# Patient Record
Sex: FEMALE | Race: WHITE | NOT HISPANIC OR LATINO | URBAN - METROPOLITAN AREA
[De-identification: names, ages, dates, MRNs, and addresses within clinical notes are randomized per-mention and may not be internally consistent; named-entity substitution may affect disease eponyms.]

---

## 2023-03-28 LAB
ALANINE AMINOTRANSFERASE (SGPT) (U/L) IN SER/PLAS: 27 U/L (ref 7–45)
ALBUMIN (G/DL) IN SER/PLAS: 4.2 G/DL (ref 3.4–5)
ALKALINE PHOSPHATASE (U/L) IN SER/PLAS: 57 U/L (ref 33–136)
ANION GAP IN SER/PLAS: 15 MMOL/L (ref 10–20)
APPEARANCE, URINE: ABNORMAL
ASPARTATE AMINOTRANSFERASE (SGOT) (U/L) IN SER/PLAS: 16 U/L (ref 9–39)
BASOPHILS (10*3/UL) IN BLOOD BY AUTOMATED COUNT: 0.03 X10E9/L (ref 0–0.1)
BASOPHILS/100 LEUKOCYTES IN BLOOD BY AUTOMATED COUNT: 0.4 % (ref 0–2)
BILIRUBIN TOTAL (MG/DL) IN SER/PLAS: 0.6 MG/DL (ref 0–1.2)
BILIRUBIN, URINE: NEGATIVE
BLOOD, URINE: ABNORMAL
CALCIDIOL (25 OH VITAMIN D3) (NG/ML) IN SER/PLAS: 27 NG/ML
CALCIUM (MG/DL) IN SER/PLAS: 9.6 MG/DL (ref 8.6–10.6)
CARBON DIOXIDE, TOTAL (MMOL/L) IN SER/PLAS: 26 MMOL/L (ref 21–32)
CHLORIDE (MMOL/L) IN SER/PLAS: 100 MMOL/L (ref 98–107)
CHOLESTEROL (MG/DL) IN SER/PLAS: 278 MG/DL (ref 0–199)
CHOLESTEROL IN HDL (MG/DL) IN SER/PLAS: 55.1 MG/DL
CHOLESTEROL/HDL RATIO: 5
COLOR, URINE: YELLOW
CREATININE (MG/DL) IN SER/PLAS: 0.91 MG/DL (ref 0.5–1.05)
EOSINOPHILS (10*3/UL) IN BLOOD BY AUTOMATED COUNT: 0.16 X10E9/L (ref 0–0.4)
EOSINOPHILS/100 LEUKOCYTES IN BLOOD BY AUTOMATED COUNT: 2.2 % (ref 0–6)
ERYTHROCYTE DISTRIBUTION WIDTH (RATIO) BY AUTOMATED COUNT: 12.8 % (ref 11.5–14.5)
ERYTHROCYTE MEAN CORPUSCULAR HEMOGLOBIN CONCENTRATION (G/DL) BY AUTOMATED: 31.7 G/DL (ref 32–36)
ERYTHROCYTE MEAN CORPUSCULAR VOLUME (FL) BY AUTOMATED COUNT: 92 FL (ref 80–100)
ERYTHROCYTES (10*6/UL) IN BLOOD BY AUTOMATED COUNT: 4.69 X10E12/L (ref 4–5.2)
GFR FEMALE: 64 ML/MIN/1.73M2
GLUCOSE (MG/DL) IN SER/PLAS: 185 MG/DL (ref 74–99)
GLUCOSE, URINE: NEGATIVE MG/DL
HEMATOCRIT (%) IN BLOOD BY AUTOMATED COUNT: 43.2 % (ref 36–46)
HEMOGLOBIN (G/DL) IN BLOOD: 13.7 G/DL (ref 12–16)
IMMATURE GRANULOCYTES/100 LEUKOCYTES IN BLOOD BY AUTOMATED COUNT: 0.5 % (ref 0–0.9)
KETONES, URINE: NEGATIVE MG/DL
LDL: 151 MG/DL (ref 0–99)
LEUKOCYTE ESTERASE, URINE: ABNORMAL
LEUKOCYTES (10*3/UL) IN BLOOD BY AUTOMATED COUNT: 7.4 X10E9/L (ref 4.4–11.3)
LYMPHOCYTES (10*3/UL) IN BLOOD BY AUTOMATED COUNT: 1.52 X10E9/L (ref 0.8–3)
LYMPHOCYTES/100 LEUKOCYTES IN BLOOD BY AUTOMATED COUNT: 20.5 % (ref 13–44)
MONOCYTES (10*3/UL) IN BLOOD BY AUTOMATED COUNT: 0.67 X10E9/L (ref 0.05–0.8)
MONOCYTES/100 LEUKOCYTES IN BLOOD BY AUTOMATED COUNT: 9 % (ref 2–10)
MUCUS, URINE: NORMAL /LPF
NEUTROPHILS (10*3/UL) IN BLOOD BY AUTOMATED COUNT: 5.01 X10E9/L (ref 1.6–5.5)
NEUTROPHILS/100 LEUKOCYTES IN BLOOD BY AUTOMATED COUNT: 67.4 % (ref 40–80)
NITRITE, URINE: NEGATIVE
NON HDL CHOLESTEROL: 223 MG/DL
NRBC (PER 100 WBCS) BY AUTOMATED COUNT: 0 /100 WBC (ref 0–0)
PH, URINE: 5 (ref 5–8)
PLATELETS (10*3/UL) IN BLOOD AUTOMATED COUNT: 195 X10E9/L (ref 150–450)
POTASSIUM (MMOL/L) IN SER/PLAS: 4.9 MMOL/L (ref 3.5–5.3)
PROTEIN TOTAL: 6.8 G/DL (ref 6.4–8.2)
PROTEIN, URINE: NEGATIVE MG/DL
RBC, URINE: 1 /HPF (ref 0–5)
SODIUM (MMOL/L) IN SER/PLAS: 136 MMOL/L (ref 136–145)
SPECIFIC GRAVITY, URINE: 1.01 (ref 1–1.03)
SQUAMOUS EPITHELIAL CELLS, URINE: 4 /HPF
THYROTROPIN (MIU/L) IN SER/PLAS BY DETECTION LIMIT <= 0.05 MIU/L: 2.63 MIU/L (ref 0.44–3.98)
THYROXINE (T4) FREE (NG/DL) IN SER/PLAS: 1.18 NG/DL (ref 0.78–1.48)
TRIGLYCERIDE (MG/DL) IN SER/PLAS: 359 MG/DL (ref 0–149)
UREA NITROGEN (MG/DL) IN SER/PLAS: 21 MG/DL (ref 6–23)
UROBILINOGEN, URINE: <2 MG/DL (ref 0–1.9)
VLDL: 72 MG/DL (ref 0–40)
WBC, URINE: 3 /HPF (ref 0–5)

## 2023-04-20 LAB — URINE CULTURE: NORMAL

## 2023-06-27 LAB
ALBUMIN (MG/L) IN URINE: 13.8 MG/L
ALBUMIN/CREATININE (UG/MG) IN URINE: 30.1 UG/MG CRT (ref 0–30)
CREATININE (MG/DL) IN URINE: 45.9 MG/DL (ref 20–320)

## 2023-08-15 LAB
CHOLESTEROL (MG/DL) IN SER/PLAS: 224 MG/DL (ref 0–199)
CHOLESTEROL IN HDL (MG/DL) IN SER/PLAS: 57.7 MG/DL
CHOLESTEROL/HDL RATIO: 3.9
LDL: 122 MG/DL (ref 0–99)
NON HDL CHOLESTEROL: 166 MG/DL
TRIGLYCERIDE (MG/DL) IN SER/PLAS: 221 MG/DL (ref 0–149)
VLDL: 44 MG/DL (ref 0–40)

## 2023-08-16 LAB
ESTIMATED AVERAGE GLUCOSE FOR HBA1C: 148 MG/DL
HEMOGLOBIN A1C/HEMOGLOBIN TOTAL IN BLOOD: 6.8 %

## 2023-11-03 DIAGNOSIS — E78.2 MIXED HYPERLIPIDEMIA: Primary | ICD-10-CM

## 2023-11-03 RX ORDER — PRAVASTATIN SODIUM 10 MG/1
10 TABLET ORAL DAILY
Qty: 90 TABLET | Refills: 1 | Status: SHIPPED
Start: 2023-11-03 | End: 2023-11-14 | Stop reason: SDUPTHER

## 2023-11-03 RX ORDER — PRAVASTATIN SODIUM 10 MG/1
10 TABLET ORAL DAILY
COMMUNITY
End: 2023-11-03 | Stop reason: SDUPTHER

## 2023-11-12 RX ORDER — PREDNISONE 20 MG/1
TABLET ORAL
COMMUNITY
Start: 2022-09-06 | End: 2023-11-14 | Stop reason: WASHOUT

## 2023-11-12 RX ORDER — TRIAMCINOLONE ACETONIDE 1 MG/G
CREAM TOPICAL 2 TIMES DAILY
COMMUNITY
End: 2023-11-14 | Stop reason: ALTCHOICE

## 2023-11-12 RX ORDER — VARENICLINE TARTRATE 0.5 (11)-1
KIT ORAL
COMMUNITY
Start: 2019-07-25 | End: 2023-11-14 | Stop reason: ALTCHOICE

## 2023-11-12 RX ORDER — CHOLECALCIFEROL (VITAMIN D3) 50 MCG
TABLET ORAL DAILY
COMMUNITY

## 2023-11-12 RX ORDER — VALACYCLOVIR HYDROCHLORIDE 1 G/1
1000 TABLET, FILM COATED ORAL 3 TIMES DAILY
COMMUNITY
Start: 2019-10-01 | End: 2023-11-14 | Stop reason: WASHOUT

## 2023-11-12 RX ORDER — GABAPENTIN 300 MG/1
300 CAPSULE ORAL 2 TIMES DAILY
COMMUNITY
Start: 2019-08-13 | End: 2023-11-14 | Stop reason: WASHOUT

## 2023-11-12 RX ORDER — LEVOTHYROXINE SODIUM 75 UG/1
75 TABLET ORAL DAILY
COMMUNITY
End: 2023-11-20 | Stop reason: SDUPTHER

## 2023-11-12 RX ORDER — FERROUS SULFATE 325(65) MG
325 TABLET ORAL 2 TIMES DAILY
COMMUNITY
End: 2024-03-22 | Stop reason: SDUPTHER

## 2023-11-12 RX ORDER — DIPHENHYDRAMINE HCL 25 MG
25 TABLET ORAL NIGHTLY PRN
COMMUNITY

## 2023-11-14 ENCOUNTER — OFFICE VISIT (OUTPATIENT)
Dept: PRIMARY CARE | Facility: CLINIC | Age: 78
End: 2023-11-14
Payer: MEDICARE

## 2023-11-14 VITALS
HEART RATE: 64 BPM | WEIGHT: 139 LBS | TEMPERATURE: 97.2 F | SYSTOLIC BLOOD PRESSURE: 130 MMHG | DIASTOLIC BLOOD PRESSURE: 80 MMHG | RESPIRATION RATE: 16 BRPM

## 2023-11-14 DIAGNOSIS — E10.9 TYPE 1 DIABETES MELLITUS WITHOUT COMPLICATION (MULTI): ICD-10-CM

## 2023-11-14 DIAGNOSIS — E78.2 MIXED HYPERLIPIDEMIA: ICD-10-CM

## 2023-11-14 LAB
POC FINGERSTICK BLOOD GLUCOSE: 112 MG/DL (ref 70–100)
POC HEMOGLOBIN A1C: 6.2 % (ref 4.2–6.5)

## 2023-11-14 PROCEDURE — 1159F MED LIST DOCD IN RCRD: CPT | Performed by: NURSE PRACTITIONER

## 2023-11-14 PROCEDURE — 83036 HEMOGLOBIN GLYCOSYLATED A1C: CPT | Performed by: NURSE PRACTITIONER

## 2023-11-14 PROCEDURE — 82962 GLUCOSE BLOOD TEST: CPT | Performed by: NURSE PRACTITIONER

## 2023-11-14 PROCEDURE — 99213 OFFICE O/P EST LOW 20 MIN: CPT | Performed by: NURSE PRACTITIONER

## 2023-11-14 PROCEDURE — 1160F RVW MEDS BY RX/DR IN RCRD: CPT | Performed by: NURSE PRACTITIONER

## 2023-11-14 RX ORDER — PRAVASTATIN SODIUM 10 MG/1
10 TABLET ORAL 2 TIMES WEEKLY
Qty: 90 TABLET | Refills: 1 | Status: SHIPPED | OUTPATIENT
Start: 2023-11-16 | End: 2024-03-07 | Stop reason: SDUPTHER

## 2023-11-14 ASSESSMENT — ENCOUNTER SYMPTOMS
ENDOCRINE NEGATIVE: 1
CARDIOVASCULAR NEGATIVE: 1
RESPIRATORY NEGATIVE: 1
NEUROLOGICAL NEGATIVE: 1
CONSTITUTIONAL NEGATIVE: 1

## 2023-11-14 NOTE — PROGRESS NOTES
Gianna Valenzuela is a 78 y.o. here for a diabetic follow up exam.     Pt states they are doing well. Following a low carbohydrate diet and is active.     Up to date with eye and foot exams, pcp visits.     Urine micro 6/23 normal  Last weight on chart: 143  Last aic on chart 8/23 was 6.8 - we started at 7.8 - TODAYS AIC IS 6.2%   And midday bs is 112   She is all low cho and activity no meds    Eye care - dr [facility]  [facility]  Follow up 3months    Vitals:    11/14/23 1256   Weight: 63 kg (139 lb)      Lab Results   Component Value Date    POCGLU 112 (A) 11/14/2023    HGBA1C 6.2 11/14/2023    HGBA1C 6.8 (A) 08/15/2023        Lab Results   Component Value Date    HGBA1C 6.8 (A) 08/15/2023   (  There were no vitals filed for this visit.   There were no vitals taken for this visit.     Current Outpatient Medications on File Prior to Visit   Medication Sig Dispense Refill    cholecalciferol (Vitamin D-3) 50 MCG (2000 UT) tablet Take by mouth once daily.      ferrous sulfate, 325 mg ferrous sulfate, tablet Take 1 tablet (325 mg) by mouth 2 times a day.      levothyroxine (Synthroid, Levoxyl) 75 mcg tablet Take 1 tablet (75 mcg) by mouth once daily.      MAGNESIUM ORAL Take 1 tablet by mouth once daily.      pravastatin (Pravachol) 10 mg tablet Take 1 tablet (10 mg) by mouth once daily. 90 tablet 1    triamcinolone (Kenalog) 0.1 % cream Apply topically 2 times a day.      diphenhydrAMINE (Benadryl Allergy) 25 mg tablet Take 1 tablet (25 mg) by mouth as needed at bedtime for itching or sleep.      varenicline (Chantix Starting Month Box) 0.5 mg (11)- 1 mg (42) tablet Take by mouth.      [DISCONTINUED] gabapentin (Neurontin) 300 mg capsule Take 1 capsule (300 mg) by mouth 2 times a day.      [DISCONTINUED] predniSONE (Deltasone) 20 mg tablet Take by mouth.  3 po qd w food x 3 days then 2 po qd w food x 3 days then 1 po qd x 3 days then stop      [DISCONTINUED] valACYclovir (Valtrex) 1 gram tablet Take 1  tablet (1,000 mg) by mouth 3 times a day.       No current facility-administered medications on file prior to visit.      Review of Systems   Constitutional: Negative.    Respiratory: Negative.     Cardiovascular: Negative.    Endocrine: Negative.    Neurological: Negative.    All other systems reviewed and are negative.       Physical Exam  Vitals and nursing note reviewed.   Constitutional:       Appearance: Normal appearance. She is normal weight.   HENT:      Head: Normocephalic.   Eyes:      Pupils: Pupils are equal, round, and reactive to light.   Cardiovascular:      Rate and Rhythm: Normal rate and regular rhythm.      Heart sounds: Normal heart sounds.   Pulmonary:      Effort: Pulmonary effort is normal.      Breath sounds: Normal breath sounds.   Skin:     General: Skin is warm and dry.   Neurological:      General: No focal deficit present.      Mental Status: She is alert and oriented to person, place, and time. Mental status is at baseline.   Psychiatric:         Mood and Affect: Mood normal.         Behavior: Behavior normal.         Thought Content: Thought content normal.         Judgment: Judgment normal.        Problem List Items Addressed This Visit    None  Visit Diagnoses         Codes    Type 1 diabetes mellitus without complication (CMS/[facility])     E10.9    Relevant Orders    POCT Fingerstick Glucose manually resulted (Completed)    POCT glycosylated hemoglobin (Hb A1C) manually resulted (Completed)

## 2023-11-20 DIAGNOSIS — E03.9 HYPOTHYROIDISM, UNSPECIFIED TYPE: Primary | ICD-10-CM

## 2023-11-20 RX ORDER — LEVOTHYROXINE SODIUM 75 UG/1
75 TABLET ORAL DAILY
Qty: 90 TABLET | Refills: 1 | Status: SHIPPED | OUTPATIENT
Start: 2023-11-20 | End: 2024-03-22 | Stop reason: SDUPTHER

## 2024-02-13 ASSESSMENT — ENCOUNTER SYMPTOMS
WEAKNESS: 0
WEIGHT LOSS: 0
DIZZINESS: 0
POLYPHAGIA: 1
BLURRED VISION: 0
SEIZURES: 0
CONFUSION: 0
POLYDIPSIA: 0
HUNGER: 1
TREMORS: 0
SWEATS: 0
HEADACHES: 0
VISUAL CHANGE: 0
SPEECH DIFFICULTY: 0
FATIGUE: 0
BLACKOUTS: 0
NERVOUS/ANXIOUS: 0

## 2024-02-14 ENCOUNTER — OFFICE VISIT (OUTPATIENT)
Dept: PRIMARY CARE | Facility: CLINIC | Age: 79
End: 2024-02-14
Payer: MEDICARE

## 2024-02-14 VITALS
TEMPERATURE: 98.3 F | HEART RATE: 67 BPM | DIASTOLIC BLOOD PRESSURE: 70 MMHG | WEIGHT: 143 LBS | SYSTOLIC BLOOD PRESSURE: 132 MMHG | OXYGEN SATURATION: 97 %

## 2024-02-14 DIAGNOSIS — Z85.828 HISTORY OF SKIN CANCER: ICD-10-CM

## 2024-02-14 DIAGNOSIS — E04.2 MULTIPLE THYROID NODULES: ICD-10-CM

## 2024-02-14 DIAGNOSIS — E11.65 TYPE 2 DIABETES MELLITUS WITH HYPERGLYCEMIA, WITHOUT LONG-TERM CURRENT USE OF INSULIN (MULTI): Primary | ICD-10-CM

## 2024-02-14 DIAGNOSIS — E66.3 OVERWEIGHT (BMI 25.0-29.9): ICD-10-CM

## 2024-02-14 LAB
POC FINGERSTICK BLOOD GLUCOSE: 115 MG/DL (ref 70–100)
POC HEMOGLOBIN A1C: 6.5 % (ref 4.2–6.5)

## 2024-02-14 PROCEDURE — 82962 GLUCOSE BLOOD TEST: CPT | Performed by: NURSE PRACTITIONER

## 2024-02-14 PROCEDURE — 83036 HEMOGLOBIN GLYCOSYLATED A1C: CPT | Performed by: NURSE PRACTITIONER

## 2024-02-14 PROCEDURE — 99213 OFFICE O/P EST LOW 20 MIN: CPT | Performed by: NURSE PRACTITIONER

## 2024-02-14 PROCEDURE — 1159F MED LIST DOCD IN RCRD: CPT | Performed by: NURSE PRACTITIONER

## 2024-02-14 ASSESSMENT — ENCOUNTER SYMPTOMS
DIZZINESS: 0
HEADACHES: 0
POLYPHAGIA: 1
TREMORS: 0
RESPIRATORY NEGATIVE: 1
CONSTITUTIONAL NEGATIVE: 1
NERVOUS/ANXIOUS: 0
CARDIOVASCULAR NEGATIVE: 1
SEIZURES: 0
CONFUSION: 0
FATIGUE: 0
SPEECH DIFFICULTY: 0
WEAKNESS: 0
POLYDIPSIA: 0
NEUROLOGICAL NEGATIVE: 1

## 2024-02-14 NOTE — PROGRESS NOTES
Gianna Valenzuela is a 78 y.o. here for a diabetic follow up exam.     Pt states they are doing well. Following a low carbohydrate diet and is active.     Up to date with eye and foot exams, pcp visits.     Urine micro 6/23 neg   Last weight 139 - last 143   Last aic 6.2 (6.8) - today 6.5    Wants a new derm - dr ellis referral - skin squamous - needs a yearly mole mapping     Will start b12 for some neuropahty sensations both feet and will follow up with 'dr hassan in 2-3 weeks     Meds: none     Thyroid nodules in past will check ultrasound     Visit Vitals  /70   Pulse 67   Temp 36.8 °C (98.3 °F)      Vitals:    02/14/24 1303   Weight: 64.9 kg (143 lb)        Lab Results   Component Value Date    POCGLU 112 (A) 11/14/2023    HGBA1C 6.2 11/14/2023    HGBA1C 6.8 (A) 08/15/2023     There were no vitals taken for this visit.   Wt Readings from Last 5 Encounters:   11/14/23 63 kg (139 lb)          Review of Systems   Constitutional: Negative.  Negative for fatigue.   Respiratory: Negative.     Cardiovascular: Negative.  Negative for chest pain.   Endocrine: Positive for polyphagia. Negative for polydipsia and polyuria.   Skin:  Negative for pallor.   Neurological: Negative.  Negative for dizziness, tremors, seizures, speech difficulty, weakness and headaches.   Psychiatric/Behavioral:  Negative for confusion. The patient is not nervous/anxious.    All other systems reviewed and are negative.       Physical Exam  Vitals and nursing note reviewed.   Constitutional:       Appearance: Normal appearance.   HENT:      Head: Normocephalic.   Eyes:      Pupils: Pupils are equal, round, and reactive to light.   Cardiovascular:      Rate and Rhythm: Normal rate and regular rhythm.      Heart sounds: Normal heart sounds.   Pulmonary:      Effort: Pulmonary effort is normal.      Breath sounds: Normal breath sounds.   Skin:     General: Skin is warm and dry.   Neurological:      General: No focal deficit present.       Mental Status: She is alert and oriented to person, place, and time. Mental status is at baseline.   Psychiatric:         Mood and Affect: Mood normal.         Behavior: Behavior normal.         Thought Content: Thought content normal.         Judgment: Judgment normal.             Problem List Items Addressed This Visit    None  Visit Diagnoses         Codes    Type 2 diabetes mellitus with hyperglycemia, without long-term current use of insulin (CMS/[facility])    -  Primary E11.65    Relevant Orders    POCT glycosylated hemoglobin (Hb A1C) manually resulted    POCT Fingerstick Glucose manually resulted (Completed)    Overweight (BMI 25.0-29.9)     E66.3             Laura L Seaver, APRN-CNP   Answers submitted by the patient for this visit:  Diabetes Questionnaire (Submitted on 2/13/2024)  Chief Complaint: Diabetes problem  Diabetes type: type 1  MedicAlert ID: No  Disease duration: 9 Months  blurred vision: No  foot paresthesias: Yes  foot ulcerations: No  visual change: No  weight loss: No  Symptom course: stable  hunger: Yes  mood changes: No  sleepiness: Yes  sweats: No  blackouts: No  hospitalization: No  nocturnal hypoglycemia: No  required assistance: No  required glucagon: No  CVA: No  heart disease: No  impotence: No  nephropathy: No  peripheral neuropathy: No  PVD: No  retinopathy: No  CAD risks: dyslipidemia, obesity, tobacco exposure  Current treatments: oral agent (monotherapy)  Treatment compliance: most of the time  Home urines: 1-2 x per month  Monitoring compliance: no compliance  Current diet: generally healthy  Meal planning: carbohydrate counting  Exercise: daily  Dietitian visit: No  Eye exam current: No  Sees podiatrist: No

## 2024-02-14 NOTE — PROGRESS NOTES
Answers submitted by the patient for this visit:  Diabetes Questionnaire (Submitted on 2/13/2024)  Chief Complaint: Diabetes problem  Diabetes type: type 1  MedicAlert ID: No  Disease duration: 9 Months  blurred vision: No  chest pain: No  fatigue: No  foot paresthesias: Yes  foot ulcerations: No  polydipsia: No  polyphagia: Yes  polyuria: No  visual change: No  weakness: No  weight loss: No  Symptom course: stable  confusion: No  dizziness: No  headaches: No  hunger: Yes  mood changes: No  nervous/anxious: No  pallor: No  seizures: No  sleepiness: Yes  speech difficulty: No  sweats: No  tremors: No  blackouts: No  hospitalization: No  nocturnal hypoglycemia: No  required assistance: No  required glucagon: No  CVA: No  heart disease: No  impotence: No  nephropathy: No  peripheral neuropathy: No  PVD: No  retinopathy: No  CAD risks: dyslipidemia, obesity, tobacco exposure  Current treatments: oral agent (monotherapy)  Treatment compliance: most of the time  Home urines: 1-2 x per month  Monitoring compliance: no compliance  Current diet: generally healthy  Meal planning: carbohydrate counting  Exercise: daily  Dietitian visit: No  Eye exam current: No  Sees podiatrist: No

## 2024-02-28 ENCOUNTER — HOSPITAL ENCOUNTER (OUTPATIENT)
Dept: RADIOLOGY | Facility: CLINIC | Age: 79
Discharge: HOME | End: 2024-02-28
Payer: MEDICARE

## 2024-02-28 DIAGNOSIS — E04.2 MULTIPLE THYROID NODULES: ICD-10-CM

## 2024-02-28 PROCEDURE — 76536 US EXAM OF HEAD AND NECK: CPT | Performed by: RADIOLOGY

## 2024-02-28 PROCEDURE — 76536 US EXAM OF HEAD AND NECK: CPT

## 2024-03-01 DIAGNOSIS — E04.2 MULTIPLE THYROID NODULES: Primary | ICD-10-CM

## 2024-03-07 ENCOUNTER — OFFICE VISIT (OUTPATIENT)
Dept: PRIMARY CARE | Facility: CLINIC | Age: 79
End: 2024-03-07
Payer: MEDICARE

## 2024-03-07 VITALS
DIASTOLIC BLOOD PRESSURE: 74 MMHG | SYSTOLIC BLOOD PRESSURE: 116 MMHG | WEIGHT: 141 LBS | TEMPERATURE: 98.1 F | HEIGHT: 61 IN | BODY MASS INDEX: 26.62 KG/M2 | HEART RATE: 70 BPM

## 2024-03-07 DIAGNOSIS — F17.200 SMOKER: ICD-10-CM

## 2024-03-07 DIAGNOSIS — E03.9 HYPOTHYROIDISM, UNSPECIFIED TYPE: ICD-10-CM

## 2024-03-07 DIAGNOSIS — E11.65 TYPE 2 DIABETES MELLITUS WITH HYPERGLYCEMIA, WITHOUT LONG-TERM CURRENT USE OF INSULIN (MULTI): ICD-10-CM

## 2024-03-07 DIAGNOSIS — E78.2 MIXED HYPERLIPIDEMIA: ICD-10-CM

## 2024-03-07 DIAGNOSIS — Z00.00 MEDICARE ANNUAL WELLNESS VISIT, SUBSEQUENT: Primary | ICD-10-CM

## 2024-03-07 DIAGNOSIS — M15.9 GENERALIZED OSTEOARTHRITIS: ICD-10-CM

## 2024-03-07 DIAGNOSIS — R91.1 LUNG NODULE: ICD-10-CM

## 2024-03-07 DIAGNOSIS — J44.9 CHRONIC OBSTRUCTIVE PULMONARY DISEASE, UNSPECIFIED COPD TYPE (MULTI): ICD-10-CM

## 2024-03-07 DIAGNOSIS — Z00.00 WELLNESS EXAMINATION: ICD-10-CM

## 2024-03-07 DIAGNOSIS — E04.1 NODULE OF LEFT LOBE OF THYROID GLAND: ICD-10-CM

## 2024-03-07 PROCEDURE — 4004F PT TOBACCO SCREEN RCVD TLK: CPT | Performed by: INTERNAL MEDICINE

## 2024-03-07 PROCEDURE — 1160F RVW MEDS BY RX/DR IN RCRD: CPT | Performed by: INTERNAL MEDICINE

## 2024-03-07 PROCEDURE — 99215 OFFICE O/P EST HI 40 MIN: CPT | Performed by: INTERNAL MEDICINE

## 2024-03-07 PROCEDURE — 1170F FXNL STATUS ASSESSED: CPT | Performed by: INTERNAL MEDICINE

## 2024-03-07 PROCEDURE — 1159F MED LIST DOCD IN RCRD: CPT | Performed by: INTERNAL MEDICINE

## 2024-03-07 PROCEDURE — G0439 PPPS, SUBSEQ VISIT: HCPCS | Performed by: INTERNAL MEDICINE

## 2024-03-07 RX ORDER — MULTIVITAMIN
TABLET ORAL
COMMUNITY
Start: 2004-12-06

## 2024-03-07 RX ORDER — VITAMIN B COMPLEX
1 CAPSULE ORAL DAILY
COMMUNITY

## 2024-03-07 RX ORDER — PRAVASTATIN SODIUM 10 MG/1
10 TABLET ORAL 2 TIMES WEEKLY
Qty: 90 TABLET | Refills: 1 | Status: SHIPPED | OUTPATIENT
Start: 2024-03-07 | End: 2024-03-22 | Stop reason: SDUPTHER

## 2024-03-07 ASSESSMENT — ENCOUNTER SYMPTOMS
SHORTNESS OF BREATH: 0
HEADACHES: 0
ACTIVITY CHANGE: 0
EYE ITCHING: 0
DEPRESSION: 0
PSYCHIATRIC NEGATIVE: 1
LIGHT-HEADEDNESS: 0
EYE DISCHARGE: 0
CHEST TIGHTNESS: 0
HEMATOLOGIC/LYMPHATIC NEGATIVE: 1
EYE PAIN: 0
GASTROINTESTINAL NEGATIVE: 1
NUMBNESS: 0
FATIGUE: 0
FEVER: 0
STRIDOR: 0
PHOTOPHOBIA: 0
DIZZINESS: 0
ENDOCRINE NEGATIVE: 1
APNEA: 0
DIAPHORESIS: 0
PALPITATIONS: 0
LOSS OF SENSATION IN FEET: 0
APPETITE CHANGE: 0
ALLERGIC/IMMUNOLOGIC NEGATIVE: 1
FACIAL ASYMMETRY: 0
UNEXPECTED WEIGHT CHANGE: 0
ARTHRALGIAS: 0
CHOKING: 0
OCCASIONAL FEELINGS OF UNSTEADINESS: 0
EYE REDNESS: 0
CHILLS: 0
WHEEZING: 0
COUGH: 0

## 2024-03-07 ASSESSMENT — PATIENT HEALTH QUESTIONNAIRE - PHQ9
SUM OF ALL RESPONSES TO PHQ9 QUESTIONS 1 AND 2: 0
2. FEELING DOWN, DEPRESSED OR HOPELESS: NOT AT ALL
1. LITTLE INTEREST OR PLEASURE IN DOING THINGS: NOT AT ALL

## 2024-03-07 ASSESSMENT — ACTIVITIES OF DAILY LIVING (ADL)
BATHING: INDEPENDENT
DRESSING: INDEPENDENT
DOING_HOUSEWORK: INDEPENDENT
GROCERY_SHOPPING: INDEPENDENT
TAKING_MEDICATION: INDEPENDENT
MANAGING_FINANCES: INDEPENDENT

## 2024-03-07 NOTE — PROGRESS NOTES
"Subjective   Reason for Visit: Gianna Valenzuela is an 78 y.o. female here for a Medicare Wellness visit.     Past Medical, Surgical, and Family History reviewed and updated in chart.    Reviewed all medications by prescribing practitioner or clinical pharmacist (such as prescriptions, OTCs, herbal therapies and supplements) and documented in the medical record.    HPIcurrently dealing with nodule needs eval ent  To see dr ramos     Patient Care Team:  Tu Nunn MD as PCP - General  Tu Nunn MD as PCP - [employer] PCP     Review of Systems   Constitutional:  Negative for activity change, appetite change, chills, diaphoresis, fatigue, fever and unexpected weight change.   HENT: Negative.     Eyes:  Negative for photophobia, pain, discharge, redness, itching and visual disturbance.   Respiratory:  Negative for apnea, cough, choking, chest tightness, shortness of breath, wheezing and stridor.    Cardiovascular:  Negative for chest pain, palpitations and leg swelling.   Gastrointestinal: Negative.    Endocrine: Negative.    Genitourinary: Negative.    Musculoskeletal:  Negative for arthralgias.   Skin: Negative.    Allergic/Immunologic: Negative.    Neurological:  Negative for dizziness, facial asymmetry, light-headedness, numbness and headaches.   Hematological: Negative.    Psychiatric/Behavioral: Negative.         Objective   Vitals:  /74   Pulse 70   Temp 36.7 °C (98.1 °F)   Ht 1.549 m (5' 1\")   Wt 64 kg (141 lb)   BMI 26.64 kg/m²       Physical Exam  HENT:      Head: Normocephalic.      Right Ear: Tympanic membrane normal.      Nose: Nose normal.      Mouth/Throat:      Mouth: Mucous membranes are moist.   Eyes:      Pupils: Pupils are equal, round, and reactive to light.   Cardiovascular:      Rate and Rhythm: Normal rate and regular rhythm.   Pulmonary:      Effort: Pulmonary effort is normal.   Abdominal:      General: Abdomen is flat. Bowel sounds are normal. "   Musculoskeletal:         General: Normal range of motion.   Skin:     General: Skin is warm.      Capillary Refill: Capillary refill takes less than 2 seconds.   Neurological:      General: No focal deficit present.      Mental Status: She is alert and oriented to person, place, and time.   Psychiatric:         Behavior: Behavior normal.         Assessment/Plan   Problem List Items Addressed This Visit       Generalized osteoarthritis    Hyperlipidemia    Nodule of left lobe of thyroid gland    Medicare annual wellness visit, subsequent - Primary    Type 2 diabetes mellitus with hyperglycemia, without long-term current use of insulin (CMS/[facility])    Chronic obstructive pulmonary disease (CMS/[facility])    Lung nodule    Relevant Orders    CT chest wo IV contrast    Hypothyroidism     Other Visit Diagnoses       Smoker        active

## 2024-03-21 ENCOUNTER — LAB (OUTPATIENT)
Dept: LAB | Facility: LAB | Age: 79
End: 2024-03-21
Payer: MEDICARE

## 2024-03-21 ENCOUNTER — HOSPITAL ENCOUNTER (OUTPATIENT)
Dept: RADIOLOGY | Facility: CLINIC | Age: 79
Discharge: HOME | End: 2024-03-21
Payer: MEDICARE

## 2024-03-21 DIAGNOSIS — Z00.00 WELLNESS EXAMINATION: ICD-10-CM

## 2024-03-21 DIAGNOSIS — E03.9 HYPOTHYROIDISM, UNSPECIFIED TYPE: ICD-10-CM

## 2024-03-21 DIAGNOSIS — R91.1 LUNG NODULE: ICD-10-CM

## 2024-03-21 LAB
ALBUMIN SERPL BCP-MCNC: 4.4 G/DL (ref 3.4–5)
ALP SERPL-CCNC: 57 U/L (ref 33–136)
ALT SERPL W P-5'-P-CCNC: 24 U/L (ref 7–45)
ANION GAP SERPL CALC-SCNC: 16 MMOL/L (ref 10–20)
AST SERPL W P-5'-P-CCNC: 18 U/L (ref 9–39)
BILIRUB SERPL-MCNC: 0.6 MG/DL (ref 0–1.2)
BUN SERPL-MCNC: 23 MG/DL (ref 6–23)
CALCIUM SERPL-MCNC: 10 MG/DL (ref 8.6–10.6)
CHLORIDE SERPL-SCNC: 103 MMOL/L (ref 98–107)
CHOLEST SERPL-MCNC: 265 MG/DL (ref 0–199)
CHOLESTEROL/HDL RATIO: 4.4
CO2 SERPL-SCNC: 25 MMOL/L (ref 21–32)
CREAT SERPL-MCNC: 0.88 MG/DL (ref 0.5–1.05)
EGFRCR SERPLBLD CKD-EPI 2021: 67 ML/MIN/1.73M*2
GLUCOSE SERPL-MCNC: 121 MG/DL (ref 74–99)
HDLC SERPL-MCNC: 59.7 MG/DL
LDLC SERPL CALC-MCNC: 149 MG/DL
NON HDL CHOLESTEROL: 205 MG/DL (ref 0–149)
POTASSIUM SERPL-SCNC: 4.2 MMOL/L (ref 3.5–5.3)
PROT SERPL-MCNC: 7 G/DL (ref 6.4–8.2)
SODIUM SERPL-SCNC: 140 MMOL/L (ref 136–145)
T4 FREE SERPL-MCNC: 1.24 NG/DL (ref 0.78–1.48)
TRIGL SERPL-MCNC: 282 MG/DL (ref 0–149)
TSH SERPL-ACNC: 5.77 MIU/L (ref 0.44–3.98)
VLDL: 56 MG/DL (ref 0–40)

## 2024-03-21 PROCEDURE — 71250 CT THORAX DX C-: CPT | Performed by: STUDENT IN AN ORGANIZED HEALTH CARE EDUCATION/TRAINING PROGRAM

## 2024-03-21 PROCEDURE — 71250 CT THORAX DX C-: CPT

## 2024-03-21 PROCEDURE — 36415 COLL VENOUS BLD VENIPUNCTURE: CPT

## 2024-03-21 PROCEDURE — 84443 ASSAY THYROID STIM HORMONE: CPT

## 2024-03-21 PROCEDURE — 80053 COMPREHEN METABOLIC PANEL: CPT

## 2024-03-21 PROCEDURE — 84439 ASSAY OF FREE THYROXINE: CPT

## 2024-03-21 PROCEDURE — 80061 LIPID PANEL: CPT

## 2024-03-22 DIAGNOSIS — E78.2 MIXED HYPERLIPIDEMIA: ICD-10-CM

## 2024-03-22 DIAGNOSIS — E03.9 HYPOTHYROIDISM, UNSPECIFIED TYPE: Primary | ICD-10-CM

## 2024-03-22 DIAGNOSIS — E61.1 IRON DEFICIENCY: ICD-10-CM

## 2024-03-22 RX ORDER — LEVOTHYROXINE SODIUM 75 UG/1
75 TABLET ORAL
Qty: 90 TABLET | Refills: 1 | Status: SHIPPED | OUTPATIENT
Start: 2024-03-22

## 2024-03-22 RX ORDER — FERROUS SULFATE 325(65) MG
325 TABLET ORAL
Qty: 90 TABLET | Refills: 3 | Status: SHIPPED | OUTPATIENT
Start: 2024-03-22 | End: 2025-03-22

## 2024-03-22 RX ORDER — PRAVASTATIN SODIUM 10 MG/1
10 TABLET ORAL 2 TIMES WEEKLY
Qty: 90 TABLET | Refills: 1 | Status: SHIPPED | OUTPATIENT
Start: 2024-03-25 | End: 2024-05-31 | Stop reason: SDUPTHER

## 2024-03-23 NOTE — PROGRESS NOTES
Subjective   Patient ID: Gianna Valenzuela is a 78 y.o. female who presents for No chief complaint on file..  HPI  TAKE ONE EXTR TAB ON SUNDAYS. TAKE ON EMPTY STOMACH 1/2 HR BEFORE ANY FOOD OR MEDS; RETEST TSH IN 2 MONTHS = MAY 2024.    Review of Systems    Objective   Physical Exam    Assessment/Plan              .VS

## 2024-03-26 ENCOUNTER — TELEPHONE (OUTPATIENT)
Dept: PRIMARY CARE | Facility: CLINIC | Age: 79
End: 2024-03-26
Payer: MEDICARE

## 2024-03-26 NOTE — TELEPHONE ENCOUNTER
----- Message from Gary Louie MD sent at 3/22/2024  8:19 PM EDT -----   UP; GOOD CHOLESTEROL 59.7 OK; LDL & TG UP ALSO. ; ?AIC?   Cr 0.88 eGFR 67 FAIR.    TSH 5.77 UP SO NEEDS MORE LT4.  #8 TABS PER WEEK. RETEST TSH IN LATE MAY 2024.

## 2024-03-26 NOTE — TELEPHONE ENCOUNTER
PATIENT NOTIFIED   WANTS TO KNOW IF SHE NEEDS TO INCREASE HER PRAVASTATIN TO DAILY OR EVERY OTHER DAY?  SHE CURRENTLY TAKES IT 2 TIMES A WEEK

## 2024-04-04 ENCOUNTER — OFFICE VISIT (OUTPATIENT)
Dept: SURGERY | Facility: CLINIC | Age: 79
End: 2024-04-04
Payer: MEDICARE

## 2024-04-04 VITALS — HEART RATE: 67 BPM | SYSTOLIC BLOOD PRESSURE: 165 MMHG | DIASTOLIC BLOOD PRESSURE: 97 MMHG

## 2024-04-04 DIAGNOSIS — E04.2 MULTIPLE THYROID NODULES: ICD-10-CM

## 2024-04-04 PROCEDURE — 1160F RVW MEDS BY RX/DR IN RCRD: CPT | Performed by: SURGERY

## 2024-04-04 PROCEDURE — 4004F PT TOBACCO SCREEN RCVD TLK: CPT | Performed by: SURGERY

## 2024-04-04 PROCEDURE — 1159F MED LIST DOCD IN RCRD: CPT | Performed by: SURGERY

## 2024-04-04 PROCEDURE — 99214 OFFICE O/P EST MOD 30 MIN: CPT | Performed by: SURGERY

## 2024-04-04 PROCEDURE — 99204 OFFICE O/P NEW MOD 45 MIN: CPT | Performed by: SURGERY

## 2024-04-04 ASSESSMENT — ENCOUNTER SYMPTOMS
ENDOCRINE NEGATIVE: 1
PSYCHIATRIC NEGATIVE: 1
NEUROLOGICAL NEGATIVE: 1
RESPIRATORY NEGATIVE: 1
EYES NEGATIVE: 1
CARDIOVASCULAR NEGATIVE: 1
CONSTITUTIONAL NEGATIVE: 1

## 2024-04-04 NOTE — PROGRESS NOTES
Subjective   Patient ID: Gianna Valenzuela is a 78 y.o. female who presents for surgical consultation for multinodular thyroid gland.    HPII saw Mrs Valenzuela in surgery clinic today.  She was referred by her nurse practitioner for surgical consultation for multinodular thyroid.  In February 2024 she underwent a thyroid ultrasound.  It shows her thyroid is normal size no goiter.  She has a 1 cm TI-RADS 1 nodule in the right thyroid lobe.  And a subcentimeter TI-RADS 4 nodule on the left side.  Neither of these would meet any criteria for biopsy.  Per ACR guidelines she technically does not require any follow-up.  However her 0.9 cm nodule on the left is close to the 1 cm rebecca.  Would recommend a 1 year follow-up ultrasound.  Therefore we will likely do that for her.      Nodule on the right was 3 mm.  On that study the nodule in the left side that is TI-RADS 4 9 mm was actually 7 mm in 2013.  Therefore its only had a 2 mm change.  This is most likely consistent with benign nodule given the very minimal change over a long time.    She does have a history of hypothyroidism and takes thyroid hormone replacement.  Her free T4 is normal at 1.24.  However her TSH is still slightly high at 5.77.  Managed by her PCP.  They are making some adjustments.    She denies any neck pain no difficulty with swallowing or breathing no troubles with her voice.  No personal history of head neck or chest radiation exposure.    Family history her niece has Hashimoto's thyroiditis with hypothyroidism.  Her daughter also has hypothyroidism.  I told her she likely has Hashimoto's as well as the source of her hypothyroidism.  It was very nice, she actually had an old ultrasound report with her today from 10 years ago    Review of Systems   Constitutional: Negative.    HENT: Negative.     Eyes: Negative.    Respiratory: Negative.     Cardiovascular: Negative.    Endocrine: Negative.    Neurological: Negative.    Psychiatric/Behavioral:  Negative.         Objective   Physical Exam  Vitals reviewed.   Constitutional:       Appearance: Normal appearance.   Eyes:      Comments: No proptosis   Neck:      Vascular: No carotid bruit.      Comments: Her thyroid feels normal.  No palpable nodules.  Trachea midline.  Cardiovascular:      Rate and Rhythm: Normal rate and regular rhythm.      Pulses: Normal pulses.      Heart sounds: Normal heart sounds.   Pulmonary:      Effort: Pulmonary effort is normal. No respiratory distress.      Breath sounds: Normal breath sounds. No wheezing or rales.   Musculoskeletal:         General: Normal range of motion.   Lymphadenopathy:      Cervical: No cervical adenopathy.   Skin:     General: Skin is warm and dry.   Neurological:      General: No focal deficit present.      Mental Status: She is alert and oriented to person, place, and time.   Psychiatric:         Mood and Affect: Mood normal.         US THYROID;  2/28/2024 4:36 pm      INDICATION:  Signs/Symptoms:thyroid nodules.      COMPARISON:  None.      ACCESSION NUMBER(S):  [id number]      ORDERING CLINICIAN:  LAURA SEAVER      TECHNIQUE:  Multiple ultrasonographic images of the thyroid gland were obtained.      FINDINGS:  Heterogeneous thyroid gland.      RIGHT LOBE:  4.1 x 1.1 x 1.5 cm. Lower pole spongiform TR 1 nodule measuring 10 x  7 x 9 mm.      LEFT LOBE:  4.1 x 1.2 x 1.5 cm. Lower pole solid hypoechoic TR 4 nodule measuring  9 x 5 x 6 mm.      ISTHMUS:  0.3 cm.      IMPRESSION:  Bilateral thyroid nodules as above.    Assessment/Plan   Mrs Valenzuela has at least a 10-year history of nodular thyroid disease.  She also has a history of hypothyroidism with 2 other family members with hypothyroidism and 1 diagnosed with Hashimoto's thyroiditis.  I told her it is very likely she may have Hashimoto's thyroiditis as the source of her hypothyroidism as well.    She is on thyroid hormone replacement is undergoing some medication adjustment with her PCP.    She had  an ultrasound 10 years ago which she has she brought a copy with.  Will get this scanned into the  medical record.  This showed bilateral thyroid nodules which she still has.  The left-sided nodule which is currently TI-RADS 4- 9 mm was actually 7 mm in the past.  Therefore this is had a 2 mm change over the last 10 years.  This is not anything worrisome and would not need biopsy.  Nodule in the right side is 1 cm and TI-RADS 1 which does not meet criteria for biopsy either.    Plan    1.  I told her we will order a 1 year follow-up ultrasound to monitor her nodules.  No biopsy needed.  She is comfortable with this plan.         Brett Farmer MD 04/04/24 12:05 PM

## 2024-04-04 NOTE — LETTER
April 4, 2024     Laura L Seaver, APRN-CNP  [address]  [place] [facility] [place] [address]    Patient: Gianna Valenzuela   YOB: 1945   Date of Visit: 4/4/2024       Dear Dr. Laura L Seaver, APRN-CNP:    Thank you for referring Gianna Valenzuela to me for evaluation. Below are my notes for this consultation.  If you have questions, please do not hesitate to call me. I look forward to following your patient along with you.       Sincerely,     Brett Farmer MD      CC: No Recipients  ______________________________________________________________________________________    Subjective  Patient ID: Gianna Valenzuela is a 78 y.o. female who presents for surgical consultation for multinodular thyroid gland.    [facility].  She was referred by her nurse practitioner for surgical consultation for multinodular thyroid.  In February 2024 she underwent a thyroid ultrasound.  It shows her thyroid is normal size no goiter.  She has a 1 cm TI-RADS 1 nodule in the right thyroid lobe.  And a subcentimeter TI-RADS 4 nodule on the left side.  Neither of these would meet any criteria for biopsy.  Per ACR guidelines she technically does not require any follow-up.  However her 0.9 cm nodule on the left is close to the 1 cm rebecca.  Would recommend a 1 year follow-up ultrasound.  Therefore we will likely do that for her.      Nodule on the right was 3 mm.  On that study the nodule in the left side that is TI-RADS 4 9 mm was actually 7 mm in 2013.  Therefore its only had a 2 mm change.  This is most likely consistent with benign nodule given the very minimal change over a long time.    She does have a history of hypothyroidism and takes thyroid hormone replacement.  Her free T4 is normal at 1.24.  However her TSH is still slightly high at 5.77.  Managed by her PCP.  They are making some adjustments.    She denies any neck pain no difficulty with swallowing or breathing no troubles with her  voice.  No personal history of head neck or chest radiation exposure.    Family history her niece has Hashimoto's thyroiditis with hypothyroidism.  Her daughter also has hypothyroidism.  I told her she likely has Hashimoto's as well as the source of her hypothyroidism.  It was very nice, she actually had an old ultrasound report with her today from 10 years ago    Review of Systems   Constitutional: Negative.    HENT: Negative.     Eyes: Negative.    Respiratory: Negative.     Cardiovascular: Negative.    Endocrine: Negative.    Neurological: Negative.    Psychiatric/Behavioral: Negative.         Objective  Physical Exam  Vitals reviewed.   Constitutional:       Appearance: Normal appearance.   Eyes:      Comments: No proptosis   Neck:      Vascular: No carotid bruit.      Comments: Her thyroid feels normal.  No palpable nodules.  Trachea midline.  Cardiovascular:      Rate and Rhythm: Normal rate and regular rhythm.      Pulses: Normal pulses.      Heart sounds: Normal heart sounds.   Pulmonary:      Effort: Pulmonary effort is normal. No respiratory distress.      Breath sounds: Normal breath sounds. No wheezing or rales.   Musculoskeletal:         General: Normal range of motion.   Lymphadenopathy:      Cervical: No cervical adenopathy.   Skin:     General: Skin is warm and dry.   Neurological:      General: No focal deficit present.      Mental Status: She is alert and oriented to person, place, and time.   Psychiatric:         Mood and Affect: Mood normal.         US THYROID;  2/28/2024 4:36 pm      INDICATION:  Signs/Symptoms:thyroid nodules.      COMPARISON:  None.      ACCESSION NUMBER(S):  [id number]      ORDERING CLINICIAN:  LAURA SEAVER      TECHNIQUE:  Multiple ultrasonographic images of the thyroid gland were obtained.      FINDINGS:  Heterogeneous thyroid gland.      RIGHT LOBE:  4.1 x 1.1 x 1.5 cm. Lower pole spongiform TR 1 nodule measuring 10 x  7 x 9 mm.      LEFT LOBE:  4.1 x 1.2 x 1.5 cm. Lower  pole solid hypoechoic TR 4 nodule measuring  9 x 5 x 6 mm.      ISTHMUS:  0.3 cm.      IMPRESSION:  Bilateral thyroid nodules as above.    Assessment/Plan  Mrs Valenzuela has at least a 10-year history of nodular thyroid disease.  She also has a history of hypothyroidism with 2 other family members with hypothyroidism and 1 diagnosed with Hashimoto's thyroiditis.  I told her it is very likely she may have Hashimoto's thyroiditis as the source of her hypothyroidism as well.    She is on thyroid hormone replacement is undergoing some medication adjustment with her PCP.    She had an ultrasound 10 years ago which she has she brought a copy with.  Will get this scanned into the  medical record.  This showed bilateral thyroid nodules which she still has.  The left-sided nodule which is currently TI-RADS 4- 9 mm was actually 7 mm in the past.  Therefore this is had a 2 mm change over the last 10 years.  This is not anything worrisome and would not need biopsy.  Nodule in the right side is 1 cm and TI-RADS 1 which does not meet criteria for biopsy either.    Plan    1.  I told her we will order a 1 year follow-up ultrasound to monitor her nodules.  No biopsy needed.  She is comfortable with this plan.         Brett Farmer MD 04/04/24 12:05 PM

## 2024-05-15 ENCOUNTER — OFFICE VISIT (OUTPATIENT)
Dept: PRIMARY CARE | Facility: CLINIC | Age: 79
End: 2024-05-15
Payer: MEDICARE

## 2024-05-15 VITALS
BODY MASS INDEX: 27.38 KG/M2 | RESPIRATION RATE: 16 BRPM | SYSTOLIC BLOOD PRESSURE: 190 MMHG | DIASTOLIC BLOOD PRESSURE: 96 MMHG | HEIGHT: 61 IN | HEART RATE: 70 BPM | WEIGHT: 145 LBS

## 2024-05-15 DIAGNOSIS — E03.9 HYPOTHYROIDISM, UNSPECIFIED TYPE: ICD-10-CM

## 2024-05-15 DIAGNOSIS — E04.1 NODULE OF LEFT LOBE OF THYROID GLAND: ICD-10-CM

## 2024-05-15 DIAGNOSIS — E11.65 TYPE 2 DIABETES MELLITUS WITH HYPERGLYCEMIA, WITHOUT LONG-TERM CURRENT USE OF INSULIN (MULTI): Primary | ICD-10-CM

## 2024-05-15 DIAGNOSIS — I10 HYPERTENSION, UNSPECIFIED TYPE: ICD-10-CM

## 2024-05-15 LAB
POC FINGERSTICK BLOOD GLUCOSE: 94 MG/DL (ref 70–100)
POC HEMOGLOBIN A1C: 5.5 % (ref 4.2–6.5)

## 2024-05-15 PROCEDURE — 83036 HEMOGLOBIN GLYCOSYLATED A1C: CPT | Performed by: NURSE PRACTITIONER

## 2024-05-15 PROCEDURE — 3080F DIAST BP >= 90 MM HG: CPT | Performed by: NURSE PRACTITIONER

## 2024-05-15 PROCEDURE — 99212 OFFICE O/P EST SF 10 MIN: CPT | Performed by: NURSE PRACTITIONER

## 2024-05-15 PROCEDURE — 4004F PT TOBACCO SCREEN RCVD TLK: CPT | Performed by: NURSE PRACTITIONER

## 2024-05-15 PROCEDURE — 82962 GLUCOSE BLOOD TEST: CPT | Performed by: NURSE PRACTITIONER

## 2024-05-15 PROCEDURE — 1159F MED LIST DOCD IN RCRD: CPT | Performed by: NURSE PRACTITIONER

## 2024-05-15 PROCEDURE — 1160F RVW MEDS BY RX/DR IN RCRD: CPT | Performed by: NURSE PRACTITIONER

## 2024-05-15 PROCEDURE — 3077F SYST BP >= 140 MM HG: CPT | Performed by: NURSE PRACTITIONER

## 2024-05-15 RX ORDER — LISINOPRIL 5 MG/1
5 TABLET ORAL DAILY
Qty: 90 TABLET | Refills: 1 | Status: SHIPPED | OUTPATIENT
Start: 2024-05-15 | End: 2025-06-19

## 2024-05-15 NOTE — PROGRESS NOTES
" Gianna Valenzuela is a 78 y.o. here for a diabetic follow up exam.     Pt states they are doing well. Following a low carbohydrate diet and is active.     Up to date with eye and foot exams, pcp visits.     Utd just saw pcp 3/24  Wt today 145 documented   No meds all tlc  Last aic 6.5% - start 6.8 - today down to 5.5 doing very well  Goal aic under 7  Active  No cp, sob,pressure, pain or swelling  Urine micro 6/23 neg - repeat today    Hx of thyroid nodule - has already seen specialist    B/p up x 2 visits documented. Rec starting lisinopril 5mg daily. I did not personally see the patient as her wait was too long. Will call her and discuss above.     Updated. Had to leave MOM for pt re: above. Requested follow up MA visit for b/p check one week. Requested 3mo follow up visit dm.         Lab Results   Component Value Date    POCGLU 94 05/15/2024    POCGLU 115 (A) 02/14/2024    POCGLU 112 (A) 11/14/2023    HGBA1C 5.5 05/15/2024    HGBA1C 6.5 02/14/2024    HGBA1C 6.2 11/14/2023    HGBA1C 6.8 (A) 08/15/2023     BP (!) 190/96   Pulse 70   Resp 16   Ht 1.549 m (5' 1\")   Wt 65.8 kg (145 lb)   BMI 27.40 kg/m²    Wt Readings from Last 5 Encounters:   05/15/24 65.8 kg (145 lb)   03/07/24 64 kg (141 lb)   02/14/24 64.9 kg (143 lb)   11/14/23 63 kg (139 lb)          Review of Systems   Pt left  Physical Exam   Pt left  Problem List Items Addressed This Visit       Nodule of left lobe of thyroid gland    Type 2 diabetes mellitus with hyperglycemia, without long-term current use of insulin (Multi) - Primary    Relevant Orders    POCT glycosylated hemoglobin (Hb A1C) manually resulted (Completed)    POCT fingerstick glucose manually resulted (Completed)    Albumin , Urine Random    Hypothyroidism        +++  BILLING - THERE IS NO CHARGE FOR THIS VISIT d/t EXTENDED WAIT TIME PT LEFT. Lsnp Laura L Seaver, APRN-CNP   "

## 2024-05-19 ASSESSMENT — DERMATOLOGY QUALITY OF LIFE (QOL) ASSESSMENT
WHAT SINGLE SKIN CONDITION LISTED BELOW IS THE PATIENT ANSWERING THE QUALITY-OF-LIFE ASSESSMENT QUESTIONS ABOUT: NONE OF THE ABOVE
RATE HOW BOTHERED YOU ARE BY SYMPTOMS OF YOUR SKIN PROBLEM (EG, ITCHING, STINGING BURNING, HURTING OR SKIN IRRITATION): 2
RATE HOW EMOTIONALLY BOTHERED YOU ARE BY YOUR SKIN PROBLEM (FOR EXAMPLE, WORRY, EMBARRASSMENT, FRUSTRATION): 1
RATE HOW BOTHERED YOU ARE BY EFFECTS OF YOUR SKIN PROBLEMS ON YOUR ACTIVITIES (EG, GOING OUT, ACCOMPLISHING WHAT YOU WANT, WORK ACTIVITIES OR YOUR RELATIONSHIPS WITH OTHERS): 0 - NEVER BOTHERED
RATE HOW BOTHERED YOU ARE BY EFFECTS OF YOUR SKIN PROBLEMS ON YOUR ACTIVITIES (EG, GOING OUT, ACCOMPLISHING WHAT YOU WANT, WORK ACTIVITIES OR YOUR RELATIONSHIPS WITH OTHERS): 0 - NEVER BOTHERED
RATE HOW BOTHERED YOU ARE BY SYMPTOMS OF YOUR SKIN PROBLEM (EG, ITCHING, STINGING BURNING, HURTING OR SKIN IRRITATION): 2
RATE HOW EMOTIONALLY BOTHERED YOU ARE BY YOUR SKIN PROBLEM (FOR EXAMPLE, WORRY, EMBARRASSMENT, FRUSTRATION): 1
WHAT SINGLE SKIN CONDITION LISTED BELOW IS THE PATIENT ANSWERING THE QUALITY-OF-LIFE ASSESSMENT QUESTIONS ABOUT: NONE OF THE ABOVE

## 2024-05-21 ENCOUNTER — OFFICE VISIT (OUTPATIENT)
Dept: DERMATOLOGY | Facility: CLINIC | Age: 79
End: 2024-05-21
Payer: MEDICARE

## 2024-05-21 DIAGNOSIS — L82.1 SEBORRHEIC KERATOSIS: ICD-10-CM

## 2024-05-21 DIAGNOSIS — L57.0 ACTINIC KERATOSIS: Primary | ICD-10-CM

## 2024-05-21 DIAGNOSIS — Z85.828 PERSONAL HISTORY OF SKIN CANCER: ICD-10-CM

## 2024-05-21 DIAGNOSIS — D48.5 NEOPLASM OF UNCERTAIN BEHAVIOR OF SKIN: ICD-10-CM

## 2024-05-21 DIAGNOSIS — Z80.8 FAMILY HISTORY OF SKIN CANCER: ICD-10-CM

## 2024-05-21 DIAGNOSIS — L81.4 LENTIGO: ICD-10-CM

## 2024-05-21 PROCEDURE — 11301 SHAVE SKIN LESION 0.6-1.0 CM: CPT | Performed by: DERMATOLOGY

## 2024-05-21 PROCEDURE — 99203 OFFICE O/P NEW LOW 30 MIN: CPT | Performed by: DERMATOLOGY

## 2024-05-21 PROCEDURE — 4004F PT TOBACCO SCREEN RCVD TLK: CPT | Performed by: DERMATOLOGY

## 2024-05-21 PROCEDURE — 17000 DESTRUCT PREMALG LESION: CPT | Performed by: DERMATOLOGY

## 2024-05-21 PROCEDURE — 1160F RVW MEDS BY RX/DR IN RCRD: CPT | Performed by: DERMATOLOGY

## 2024-05-21 PROCEDURE — 1159F MED LIST DOCD IN RCRD: CPT | Performed by: DERMATOLOGY

## 2024-05-21 NOTE — PROGRESS NOTES
Subjective     Gianna Valenzuela is a 78 y.o. female who presents for the following: Suspicious Skin Lesion (Pt presents for examination of spots to face and chest. Pt reports personal history of NMSC.  Pt reports family hx of NMSC (brother). ).     Review of Systems:  No other skin or systemic complaints other than what is documented elsewhere in the note.    The following portions of the chart were reviewed this encounter and updated as appropriate:   Tobacco  Allergies  Meds  Problems  Med Hx  Surg Hx  Fam Hx         Skin Cancer History  Patient reports history of NMSC      Specialty Problems    None       Objective   Well appearing patient in no apparent distress; mood and affect are within normal limits.    A focused skin examination was performed. All findings within normal limits unless otherwise noted below.    Assessment/Plan   1. Actinic keratosis  Right Buccal Cheek  Erythematous scaly macule(s)    -Discussed nature of diagnosis and treatment options.   -Patient wishes to proceed with Cryotherapy today  -Possible side effects of liquid nitrogen treatment reviewed including formation of blisters, crusting, tenderness, scar, and discoloration which may be permanent.  -Patient advised to return the office for re-evaluation if the treated lesion(s) do not resolve within 4-6 weeks. Patient verbalizes understanding.    Destr of lesion - Right Buccal Cheek  Complexity: simple    Destruction method: cryotherapy    Informed consent: discussed and consent obtained    Lesion destroyed using liquid nitrogen: Yes    Outcome: patient tolerated procedure well with no complications    Post-procedure details: wound care instructions given      2. Neoplasm of uncertain behavior of skin  Left Medial Chest  8mm erythematous papule with white perifollicular circles              Shave removal    Lesion diameter (cm):  0.8  Informed consent: discussed and consent obtained    Timeout: patient name, date of birth, surgical  site, and procedure verified    Procedure prep:  Patient was prepped and draped  Anesthesia: the lesion was anesthetized in a standard fashion    Anesthetic:  1% lidocaine w/ epinephrine 1-100,000 local infiltration  Instrument used: DermaBlade    Hemostasis achieved with: aluminum chloride    Outcome: patient tolerated procedure well    Post-procedure details: sterile dressing applied and wound care instructions given    Dressing type: bandage and petrolatum      Staff Communication: Dermatology Local Anesthesia: Site Location: L medial chest 1 % Lidocaine / Epinephrine - Amount: 0.5cc    Specimen 1 - Dermatopathology- DERM LAB  Differential Diagnosis: r/o SCC v ISK  Check Margins Yes/No?:    Comments:    Dermpath Lab: Routine Histopathology (formalin-fixed tissue)    3. Lentigo  Chest - Medial (Center)  Tan macules    -Benign appearing on exam  -Reassurance, recommend observation    4. Seborrheic keratosis (2)  Left Breast, Right Breast  Stuck on, waxy macule(s)/papule(s)/plaque(s) with comedo-like openings and milia like cysts    -Discussed the nature of the diagnosis  -Reassurance, recommend continued observation    5. Personal history of skin cancer    Personal History of Non-Melanoma Skin Cancer, likely NMSC  Patient was a patient at [facility]  -Discussed the need for annual or semi-annual skin examinations and to return sooner if any new or changing lesions are noticed. Patient verbalizes understanding    6. Family history of skin cancer    Family history of NMSC, Brother        Follow up as needed. Patient declines full skin examination today.  Discussed if there are any changes or development of concerning symptoms (lesion/skin condition is changing, bleeding, enlarging, or worsening) the patient is to contact my office. The patient verbalizes understanding.    Elke Sol MD  5/21/2024

## 2024-05-28 DIAGNOSIS — C44.92 SCC (SQUAMOUS CELL CARCINOMA): Primary | ICD-10-CM

## 2024-05-28 LAB
LABORATORY COMMENT REPORT: NORMAL
PATH REPORT.FINAL DX SPEC: NORMAL
PATH REPORT.GROSS SPEC: NORMAL
PATH REPORT.MICROSCOPIC SPEC OTHER STN: NORMAL
PATH REPORT.RELEVANT HX SPEC: NORMAL
PATH REPORT.TOTAL CANCER: NORMAL

## 2024-05-29 NOTE — RESULT ENCOUNTER NOTE
Pt was contacted and notified of malignant results at this time.  Pt is agreeable to treatment plan. No further questions noted.       Zay Knight LPN

## 2024-05-31 ENCOUNTER — TELEPHONE (OUTPATIENT)
Dept: PRIMARY CARE | Facility: CLINIC | Age: 79
End: 2024-05-31
Payer: MEDICARE

## 2024-05-31 DIAGNOSIS — E78.2 MIXED HYPERLIPIDEMIA: ICD-10-CM

## 2024-05-31 RX ORDER — PRAVASTATIN SODIUM 10 MG/1
10 TABLET ORAL 2 TIMES WEEKLY
Qty: 10 TABLET | Refills: 1 | Status: SHIPPED | OUTPATIENT
Start: 2024-06-03

## 2024-06-07 ENCOUNTER — PROCEDURE VISIT (OUTPATIENT)
Dept: DERMATOLOGY | Facility: CLINIC | Age: 79
End: 2024-06-07
Payer: MEDICARE

## 2024-06-07 DIAGNOSIS — C44.529 SQUAMOUS CELL CARCINOMA OF SKIN OF CHEST: ICD-10-CM

## 2024-06-07 PROCEDURE — 12032 INTMD RPR S/A/T/EXT 2.6-7.5: CPT | Performed by: STUDENT IN AN ORGANIZED HEALTH CARE EDUCATION/TRAINING PROGRAM

## 2024-06-07 PROCEDURE — 11602 EXC TR-EXT MAL+MARG 1.1-2 CM: CPT | Performed by: STUDENT IN AN ORGANIZED HEALTH CARE EDUCATION/TRAINING PROGRAM

## 2024-06-07 NOTE — PROGRESS NOTES
Excision Operative Note    Date of Surgery:  6/7/2024  Surgeon:  Kaleb Arzola MD  Office Location: [address] [address]  Dept: [phone]  Dept Fax: [phone]  Referring Provider: Elke Sol MD  [address] [place] [id number]    Subjective   Gianna Valenzuela is a 78 y.o. female who presents for the following: Excision for squamous cell carcinoma on the left medial chest.     According to the patient, the lesion has been present for approximately 1 month at the time of diagnosis.  The lesion is not causing symptoms.  The lesion has not been treated previously.    The patient does not have a pacemaker / defibrillator.  The patient does not have a heart valve / joint replacement.    The patient is not on blood thinners.   The patient does not have a history of hepatitis B or C.  The patient does not have a history of HIV.  The patient does not have a history of immunosuppression (e.g. organ transplantation, malignancy, medications)    Is it okay to leave a phone message with results? Yes  Who else may we leave results with: n/a    The following portions of the chart were reviewed this encounter and updated as appropriate:         Assessment/Plan   Pre-procedure:   Obtained informed consent: written from patient  The surgical site was identified with two staff members (MD and nurse) and confirmed with the patient.     Intra-operative:   Audible time out called at : 2:31 PM 06/07/24  by: Justine Fuller RN   Verified patient name, birthdate, site, specimen bottle label & requisition.    The planned procedure(s) was again reviewed with the patient. The risks of bleeding, infection, nerve damage and scarring were reviewed. The patient identity, surgical site, and planned procedure(s) were verified.     Biopsy Accession Number: [id number]  Squamous cell carcinoma of skin of chest  Left Medial Chest    Skin  excision    Lesion length (cm):  1  Lesion width (cm):  0.7  Margin per side (cm):  0.4  Total excision diameter (cm):  1.8  Informed consent: discussed and consent obtained    Timeout: patient name, date of birth, surgical site, and procedure verified    Procedure prep:  Patient prepped in sterile fashion  Anesthesia: the lesion was anesthetized in a standard fashion    Anesthetic:  1% lidocaine w/ epinephrine 1-100,000 local infiltration  Instrument used: #15 blade    Hemostasis achieved with: electrodesiccation    Outcome: patient tolerated procedure well with no complications    Post-procedure details: sterile dressing applied and wound care instructions given    Dressing type: pressure dressing    Additional details:  The nature of the diagnosis was explained. The lesion is a skin cancer.  It is locally aggressive but has a very low to non-existent risk of spreading.  The condition is associated with sun exposure.  Warning signs of non-melanoma skin cancer discussed. Patient was instructed to perform monthly self skin examination.  We recommended that the patient have regular full skin exams given an increased risk of subsequent skin cancers. The patient was instructed to use sun protective behaviors including use of broad spectrum sunscreens and sun protective clothing to reduce risk of skin cancers.  Excision was discussed with the patient. The risks, benefits and potential adverse effects were reviewed. Discussion included but was not limited to the cure rate, relative cost, wound care requirements, activity restrictions, likely scar outcome and time to heal were reviewed. It was explained that the scar would be longer than the original lesion.  The patient elected to proceed with exicision today.    Skin repair  Complexity:  Intermediate  Final length (cm):  4  Informed consent: discussed and consent obtained    Timeout: patient name, date of birth, surgical site, and procedure verified    Procedure prep:   Patient prepped in sterile fashion  Anesthesia: the lesion was anesthetized in a standard fashion    Anesthetic:  1% lidocaine w/ epinephrine 1-100,000 local infiltration  Reason for type of repair: reduce tension to allow closure    Undermining: edges undermined    Subcutaneous layers (deep stitches):   Suture size:  4-0  Suture type: Vicryl (polyglactin 910)    Stitches:  Buried vertical mattress  Fine/surface layer approximation (top stitches):   Suture size:  5-0  Suture type: Prolene (polypropylene)    Stitches: simple running    Suture removal (days):  7  Hemostasis achieved with: electrodesiccation  Outcome: patient tolerated procedure well with no complications    Post-procedure details: sterile dressing applied and wound care instructions given    Dressing type: pressure dressing      Staff Communication: Dermatology Local Anesthesia: Site Location: Left Medial Chest 1 % Lidocaine / Epinephrine - Amount:12cc    Specimen 1 - Dermatopathology- DERM LAB  Differential Diagnosis: Squamous cell carcinoma  Check Margins Yes/No?:  Yes  Comments:  Suture at superior Tip  Dermpath Lab: Routine Histopathology (formalin-fixed tissue)      Intermediate Linear Repair:  Given the location and size of the defect, it was determined that an intermediate layered linear closure was required to restore normal anatomy and function. The repair is an intermediate closure as two layers of sutures were required. The defect was undermined extensively at the level of the subcutaneous plane. Standing cutaneous cones were removed using Burow's triangles. The wound edges were brought into close approximation with buried vertical mattress sutures. The remainder of the wound was then closed with epidermal top sutures.    The final repair measured 4.0 cm        Wound care was discussed, and the patient was given written post-operative wound care instructions.      The patient will follow up with Kaleb Arzola MD in 1 week for suture removal,  and will follow up with their primary dermatologist as scheduled.

## 2024-06-14 ENCOUNTER — APPOINTMENT (OUTPATIENT)
Dept: DERMATOLOGY | Facility: CLINIC | Age: 79
End: 2024-06-14
Payer: MEDICARE

## 2024-06-14 DIAGNOSIS — Z48.02 ENCOUNTER FOR REMOVAL OF SUTURES: ICD-10-CM

## 2024-06-14 ASSESSMENT — DERMATOLOGY QUALITY OF LIFE (QOL) ASSESSMENT
RATE HOW BOTHERED YOU ARE BY SYMPTOMS OF YOUR SKIN PROBLEM (EG, ITCHING, STINGING BURNING, HURTING OR SKIN IRRITATION): 0 - NEVER BOTHERED
WHAT SINGLE SKIN CONDITION LISTED BELOW IS THE PATIENT ANSWERING THE QUALITY-OF-LIFE ASSESSMENT QUESTIONS ABOUT: NONE OF THE ABOVE
RATE HOW EMOTIONALLY BOTHERED YOU ARE BY YOUR SKIN PROBLEM (FOR EXAMPLE, WORRY, EMBARRASSMENT, FRUSTRATION): 0 - NEVER BOTHERED
RATE HOW BOTHERED YOU ARE BY SYMPTOMS OF YOUR SKIN PROBLEM (EG, ITCHING, STINGING BURNING, HURTING OR SKIN IRRITATION): 0 - NEVER BOTHERED
RATE HOW EMOTIONALLY BOTHERED YOU ARE BY YOUR SKIN PROBLEM (FOR EXAMPLE, WORRY, EMBARRASSMENT, FRUSTRATION): 0 - NEVER BOTHERED
RATE HOW BOTHERED YOU ARE BY EFFECTS OF YOUR SKIN PROBLEMS ON YOUR ACTIVITIES (EG, GOING OUT, ACCOMPLISHING WHAT YOU WANT, WORK ACTIVITIES OR YOUR RELATIONSHIPS WITH OTHERS): 0 - NEVER BOTHERED
RATE HOW BOTHERED YOU ARE BY EFFECTS OF YOUR SKIN PROBLEMS ON YOUR ACTIVITIES (EG, GOING OUT, ACCOMPLISHING WHAT YOU WANT, WORK ACTIVITIES OR YOUR RELATIONSHIPS WITH OTHERS): 0 - NEVER BOTHERED
WHAT SINGLE SKIN CONDITION LISTED BELOW IS THE PATIENT ANSWERING THE QUALITY-OF-LIFE ASSESSMENT QUESTIONS ABOUT: NONE OF THE ABOVE

## 2024-09-19 ENCOUNTER — APPOINTMENT (OUTPATIENT)
Dept: PRIMARY CARE | Facility: CLINIC | Age: 79
End: 2024-09-19
Payer: MEDICARE

## 2024-10-01 ENCOUNTER — HOSPITAL ENCOUNTER (OUTPATIENT)
Dept: RADIOLOGY | Facility: CLINIC | Age: 79
Discharge: HOME | End: 2024-10-01
Payer: MEDICARE

## 2024-10-01 ENCOUNTER — LAB (OUTPATIENT)
Dept: LAB | Facility: LAB | Age: 79
End: 2024-10-01
Payer: MEDICARE

## 2024-10-01 ENCOUNTER — APPOINTMENT (OUTPATIENT)
Dept: PRIMARY CARE | Facility: CLINIC | Age: 79
End: 2024-10-01
Payer: MEDICARE

## 2024-10-01 VITALS
TEMPERATURE: 97.1 F | OXYGEN SATURATION: 97 % | HEART RATE: 71 BPM | SYSTOLIC BLOOD PRESSURE: 122 MMHG | WEIGHT: 141 LBS | BODY MASS INDEX: 26.64 KG/M2 | DIASTOLIC BLOOD PRESSURE: 86 MMHG

## 2024-10-01 DIAGNOSIS — M54.6 RIGHT-SIDED THORACIC BACK PAIN, UNSPECIFIED CHRONICITY: ICD-10-CM

## 2024-10-01 DIAGNOSIS — E11.65 TYPE 2 DIABETES MELLITUS WITH HYPERGLYCEMIA, WITHOUT LONG-TERM CURRENT USE OF INSULIN: ICD-10-CM

## 2024-10-01 DIAGNOSIS — J44.9 CHRONIC OBSTRUCTIVE PULMONARY DISEASE, UNSPECIFIED COPD TYPE (MULTI): ICD-10-CM

## 2024-10-01 DIAGNOSIS — Z13.9 SCREENING DUE: ICD-10-CM

## 2024-10-01 DIAGNOSIS — R91.1 LUNG NODULE: ICD-10-CM

## 2024-10-01 DIAGNOSIS — Z85.828 HISTORY OF SKIN CANCER: ICD-10-CM

## 2024-10-01 DIAGNOSIS — E03.9 HYPOTHYROIDISM, UNSPECIFIED TYPE: ICD-10-CM

## 2024-10-01 DIAGNOSIS — E78.2 MIXED HYPERLIPIDEMIA: ICD-10-CM

## 2024-10-01 DIAGNOSIS — E04.1 NODULE OF LEFT LOBE OF THYROID GLAND: ICD-10-CM

## 2024-10-01 DIAGNOSIS — M54.6 RIGHT-SIDED THORACIC BACK PAIN, UNSPECIFIED CHRONICITY: Primary | ICD-10-CM

## 2024-10-01 LAB
ALBUMIN SERPL BCP-MCNC: 4.3 G/DL (ref 3.4–5)
ALP SERPL-CCNC: 52 U/L (ref 33–136)
ALT SERPL W P-5'-P-CCNC: 22 U/L (ref 7–45)
ANION GAP SERPL CALC-SCNC: 15 MMOL/L (ref 10–20)
AST SERPL W P-5'-P-CCNC: 18 U/L (ref 9–39)
BILIRUB SERPL-MCNC: 0.7 MG/DL (ref 0–1.2)
BUN SERPL-MCNC: 23 MG/DL (ref 6–23)
CALCIUM SERPL-MCNC: 9.6 MG/DL (ref 8.6–10.6)
CHLORIDE SERPL-SCNC: 103 MMOL/L (ref 98–107)
CHOLEST SERPL-MCNC: 256 MG/DL (ref 0–199)
CHOLESTEROL/HDL RATIO: 4.6
CO2 SERPL-SCNC: 27 MMOL/L (ref 21–32)
CREAT SERPL-MCNC: 0.99 MG/DL (ref 0.5–1.05)
EGFRCR SERPLBLD CKD-EPI 2021: 57 ML/MIN/1.73M*2
GLUCOSE SERPL-MCNC: 123 MG/DL (ref 74–99)
HDLC SERPL-MCNC: 55.2 MG/DL
LDLC SERPL CALC-MCNC: 141 MG/DL
NON HDL CHOLESTEROL: 201 MG/DL (ref 0–149)
POC HEMOGLOBIN A1C: 6.8 % (ref 4.2–6.5)
POTASSIUM SERPL-SCNC: 4.3 MMOL/L (ref 3.5–5.3)
PROT SERPL-MCNC: 7.1 G/DL (ref 6.4–8.2)
SODIUM SERPL-SCNC: 141 MMOL/L (ref 136–145)
TRIGL SERPL-MCNC: 301 MG/DL (ref 0–149)
TSH SERPL-ACNC: 2.57 MIU/L (ref 0.44–3.98)
VIT B12 SERPL-MCNC: 1119 PG/ML (ref 211–911)
VLDL: 60 MG/DL (ref 0–40)

## 2024-10-01 PROCEDURE — 99213 OFFICE O/P EST LOW 20 MIN: CPT | Performed by: INTERNAL MEDICINE

## 2024-10-01 PROCEDURE — 83036 HEMOGLOBIN GLYCOSYLATED A1C: CPT | Performed by: INTERNAL MEDICINE

## 2024-10-01 PROCEDURE — 72070 X-RAY EXAM THORAC SPINE 2VWS: CPT

## 2024-10-01 PROCEDURE — 1160F RVW MEDS BY RX/DR IN RCRD: CPT | Performed by: INTERNAL MEDICINE

## 2024-10-01 PROCEDURE — 82570 ASSAY OF URINE CREATININE: CPT

## 2024-10-01 PROCEDURE — 80053 COMPREHEN METABOLIC PANEL: CPT

## 2024-10-01 PROCEDURE — 82607 VITAMIN B-12: CPT

## 2024-10-01 PROCEDURE — 84443 ASSAY THYROID STIM HORMONE: CPT

## 2024-10-01 PROCEDURE — 90662 IIV NO PRSV INCREASED AG IM: CPT | Performed by: INTERNAL MEDICINE

## 2024-10-01 PROCEDURE — 80061 LIPID PANEL: CPT

## 2024-10-01 PROCEDURE — 1159F MED LIST DOCD IN RCRD: CPT | Performed by: INTERNAL MEDICINE

## 2024-10-01 PROCEDURE — 82043 UR ALBUMIN QUANTITATIVE: CPT

## 2024-10-01 PROCEDURE — G0008 ADMIN INFLUENZA VIRUS VAC: HCPCS | Performed by: INTERNAL MEDICINE

## 2024-10-01 PROCEDURE — 36415 COLL VENOUS BLD VENIPUNCTURE: CPT

## 2024-10-01 RX ORDER — PRAVASTATIN SODIUM 10 MG/1
10 TABLET ORAL 2 TIMES WEEKLY
Qty: 24 TABLET | Refills: 3 | Status: SHIPPED | OUTPATIENT
Start: 2024-10-03 | End: 2025-10-03

## 2024-10-01 RX ORDER — LEVOTHYROXINE SODIUM 75 UG/1
75 TABLET ORAL
Qty: 90 TABLET | Refills: 1 | Status: SHIPPED | OUTPATIENT
Start: 2024-10-01

## 2024-10-01 NOTE — PROGRESS NOTES
Subjective   Patient ID: Gianna Valenzuela is a 79 y.o. female who presents for Follow-up (6 mo).    HPI right sided back pain takes a while to get going in the morning  No falls   Non gymn time    Had covid now better    Review of Systems    Objective   /86 (BP Location: Left arm, Patient Position: Sitting, BP Cuff Size: Adult)   Pulse 71   Temp 36.2 °C (97.1 °F) (Temporal)   Wt 64 kg (141 lb)   SpO2 97%   BMI 26.64 kg/m²     Physical Exam  NAD  CARD RRR  PULM CTA  ABD NEG   EXT  NL    Assessment/Plan   Diagnoses and all orders for this visit:  Right-sided thoracic back pain, unspecified chronicity  Comments:  lets do xray  Orders:  -     Referral to Clinical Pharmacy; Future  -     XR thoracic spine 2 views; Future  -     POCT glycosylated hemoglobin (Hb A1C) manually resulted  -     Comprehensive metabolic panel; Future  Hypothyroidism, unspecified type  Comments:  on meds  Orders:  -     Referral to Clinical Pharmacy; Future  -     XR thoracic spine 2 views; Future  -     POCT glycosylated hemoglobin (Hb A1C) manually resulted  -     Comprehensive metabolic panel; Future  Chronic obstructive pulmonary disease, unspecified COPD type (Multi)  Comments:  still smokes  Orders:  -     Referral to Clinical Pharmacy; Future  -     XR thoracic spine 2 views; Future  -     POCT glycosylated hemoglobin (Hb A1C) manually resulted  -     Comprehensive metabolic panel; Future  Type 2 diabetes mellitus with hyperglycemia, without long-term current use of insulin  Comments:  all good  Orders:  -     Referral to Clinical Pharmacy; Future  -     XR thoracic spine 2 views; Future  -     POCT glycosylated hemoglobin (Hb A1C) manually resulted  -     Comprehensive metabolic panel; Future  Nodule of left lobe of thyroid gland  Comments:  see rodríguez  Orders:  -     Referral to Clinical Pharmacy; Future  -     XR thoracic spine 2 views; Future  -     POCT glycosylated hemoglobin (Hb A1C) manually resulted  -     Comprehensive  metabolic panel; Future  Mixed hyperlipidemia  Comments:  good  Orders:  -     Referral to Clinical Pharmacy; Future  -     XR thoracic spine 2 views; Future  -     POCT glycosylated hemoglobin (Hb A1C) manually resulted  -     Comprehensive metabolic panel; Future  Screening due  -     Referral to Clinical Pharmacy; Future  -     XR thoracic spine 2 views; Future  -     POCT glycosylated hemoglobin (Hb A1C) manually resulted  -     Lipid Panel; Future  -     Comprehensive metabolic panel; Future  Lung nodule  Comments:  see ct scan next due  Other orders  -     Follow Up In Primary Care - Established  -     Flu vaccine, trivalent, preservative free, HIGH-DOSE, age 65y+ (Fluzone)

## 2024-10-02 LAB
CREAT UR-MCNC: 243.4 MG/DL (ref 20–320)
MICROALBUMIN UR-MCNC: 47.5 MG/L
MICROALBUMIN/CREAT UR: 19.5 UG/MG CREAT

## 2024-10-03 ENCOUNTER — TELEPHONE (OUTPATIENT)
Dept: PRIMARY CARE | Facility: CLINIC | Age: 79
End: 2024-10-03
Payer: MEDICARE

## 2024-10-03 ENCOUNTER — APPOINTMENT (OUTPATIENT)
Dept: RADIOLOGY | Facility: CLINIC | Age: 79
End: 2024-10-03
Payer: MEDICARE

## 2024-10-03 NOTE — TELEPHONE ENCOUNTER
----- Message from Tu Nunn sent at 10/3/2024  8:37 AM EDT -----  Can we make tammy she is aware she is Diabetic  refer to Maru martin  ----- Message -----  From: Destinee Espinosa  Sent: 10/1/2024   8:58 AM EDT  To: Tu Nunn MD

## 2024-10-03 NOTE — TELEPHONE ENCOUNTER
Patient notified of her A1C (aware diabetic already) she has been scheduled with Laura Seaver 10/17/24

## 2024-10-10 DIAGNOSIS — J44.9 CHRONIC OBSTRUCTIVE PULMONARY DISEASE, UNSPECIFIED COPD TYPE (MULTI): Primary | ICD-10-CM

## 2024-10-10 DIAGNOSIS — E11.65 TYPE 2 DIABETES MELLITUS WITH HYPERGLYCEMIA, WITHOUT LONG-TERM CURRENT USE OF INSULIN: ICD-10-CM

## 2024-10-17 ENCOUNTER — HOSPITAL ENCOUNTER (OUTPATIENT)
Dept: RADIOLOGY | Facility: CLINIC | Age: 79
Discharge: HOME | End: 2024-10-17
Payer: MEDICARE

## 2024-10-17 ENCOUNTER — APPOINTMENT (OUTPATIENT)
Dept: PRIMARY CARE | Facility: CLINIC | Age: 79
End: 2024-10-17
Payer: MEDICARE

## 2024-10-17 VITALS
WEIGHT: 144.4 LBS | DIASTOLIC BLOOD PRESSURE: 90 MMHG | BODY MASS INDEX: 27.26 KG/M2 | HEART RATE: 78 BPM | SYSTOLIC BLOOD PRESSURE: 140 MMHG | TEMPERATURE: 97.4 F | OXYGEN SATURATION: 98 % | HEIGHT: 61 IN

## 2024-10-17 DIAGNOSIS — E78.2 MIXED HYPERLIPIDEMIA: ICD-10-CM

## 2024-10-17 DIAGNOSIS — E03.9 HYPOTHYROIDISM, UNSPECIFIED TYPE: ICD-10-CM

## 2024-10-17 DIAGNOSIS — E11.65 TYPE 2 DIABETES MELLITUS WITH HYPERGLYCEMIA, WITHOUT LONG-TERM CURRENT USE OF INSULIN: Primary | ICD-10-CM

## 2024-10-17 DIAGNOSIS — R91.1 LUNG NODULE: ICD-10-CM

## 2024-10-17 DIAGNOSIS — I10 HYPERTENSION, UNSPECIFIED TYPE: ICD-10-CM

## 2024-10-17 PROCEDURE — 99213 OFFICE O/P EST LOW 20 MIN: CPT | Performed by: NURSE PRACTITIONER

## 2024-10-17 PROCEDURE — 71250 CT THORAX DX C-: CPT

## 2024-10-17 PROCEDURE — 3080F DIAST BP >= 90 MM HG: CPT | Performed by: NURSE PRACTITIONER

## 2024-10-17 PROCEDURE — 3077F SYST BP >= 140 MM HG: CPT | Performed by: NURSE PRACTITIONER

## 2024-10-17 PROCEDURE — 1159F MED LIST DOCD IN RCRD: CPT | Performed by: NURSE PRACTITIONER

## 2024-10-17 PROCEDURE — 1160F RVW MEDS BY RX/DR IN RCRD: CPT | Performed by: NURSE PRACTITIONER

## 2024-10-17 ASSESSMENT — ENCOUNTER SYMPTOMS
WEAKNESS: 0
NERVOUS/ANXIOUS: 0
SPEECH DIFFICULTY: 0
FATIGUE: 1
RESPIRATORY NEGATIVE: 1
NEUROLOGICAL NEGATIVE: 1
TREMORS: 0
SEIZURES: 0
MUSCULOSKELETAL NEGATIVE: 1
HUNGER: 0
CONSTITUTIONAL NEGATIVE: 1
POLYPHAGIA: 0
ENDOCRINE NEGATIVE: 1
CONFUSION: 0
BLACKOUTS: 0
CARDIOVASCULAR NEGATIVE: 1
DIZZINESS: 0
POLYDIPSIA: 0
GASTROINTESTINAL NEGATIVE: 1
SWEATS: 0
WEIGHT LOSS: 0
PSYCHIATRIC NEGATIVE: 1
VISUAL CHANGE: 0
HEADACHES: 0
BLURRED VISION: 0

## 2024-10-17 NOTE — PROGRESS NOTES
Answers submitted by the patient for this visit:  Diabetes Questionnaire (Submitted on 10/17/2024)  Chief Complaint: Diabetes problem  Diabetes type: type 2  MedicAlert ID: No  blurred vision: No  foot paresthesias: Yes  foot ulcerations: No  visual change: No  weight loss: No  Symptom course: stable  hunger: No  mood changes: No  sleepiness: No  sweats: No  blackouts: No  hospitalization: No  nocturnal hypoglycemia: No  required assistance: No  required glucagon: No  CVA: No  heart disease: No  impotence: No  nephropathy: No  peripheral neuropathy: No  PVD: No  retinopathy: No  CAD risks: dyslipidemia, hypertension, obesity, tobacco exposure  Current treatments: diet  Treatment compliance: most of the time  breakfast time: 8-9 am  lunch time: 12-1 pm  dinner time: 6-7 pm  Bedtime: after 11 pm  Current diet: generally healthy  Exercise: daily  Dietitian visit: No  Eye exam current: Yes  Sees podiatrist: No   Gianna Valenzuela is a 79 y.o. here for a diabetic follow up exam.     Pt states they are doing well. Following a low carbohydrate diet and is active.     Up to date with eye and foot exams, pcp visits.     Here per request of pcp for diabetes education/diet plan. She has that. She has been busy/travel and just got off track. She is back on. Wt loss already.    Last wt 141 (145)  Last bmi 26.64  Last aic 6.8 (under goal - prior 5.5, 6.5, 6.2, 6.8)  She is 79 yrs young her aic goal is 7-8 but yes she has trended back up    She plans on restarting exercise  And back on 30-40 total carbs per meal x 3, 15 total carbs per snack x 3 including one 15 cho snack ac bed with protien attempt at all included   Make sure to drink your water stay hydrated as well.  We will bring her back in 6 weeks to check on her improvement.  10/24 pcp just also checked her tsh and euthyroid/normal see labs  Chol is a bit better on 10/24 labs however not at goal. Tchol 256. . She is on pravastatin 10mg daily, and thyroid  replacement.    She already reduced her b12 to eod  And she will restart her lisinopril at 2.5mg daily for b/p and for renal protection.             Lab Results   Component Value Date    POCGLU 94 05/15/2024    POCGLU 115 (A) 02/14/2024    POCGLU 112 (A) 11/14/2023    HGBA1C 6.8 (A) 10/01/2024    HGBA1C 5.5 05/15/2024    HGBA1C 6.5 02/14/2024    HGBA1C 6.2 11/14/2023    HGBA1C 6.8 (A) 08/15/2023     There were no vitals taken for this visit.   Wt Readings from Last 5 Encounters:   10/01/24 64 kg (141 lb)   05/15/24 65.8 kg (145 lb)   03/07/24 64 kg (141 lb)   02/14/24 64.9 kg (143 lb)   11/14/23 63 kg (139 lb)          Review of Systems   Constitutional: Negative.    HENT: Negative.     Respiratory: Negative.     Cardiovascular: Negative.    Gastrointestinal: Negative.    Endocrine: Negative.    Genitourinary: Negative.    Musculoskeletal: Negative.    Skin: Negative.    Neurological: Negative.    Psychiatric/Behavioral: Negative.          Physical Exam  Vitals and nursing note reviewed.   Constitutional:       Appearance: Normal appearance.   HENT:      Head: Normocephalic.   Eyes:      Pupils: Pupils are equal, round, and reactive to light.   Cardiovascular:      Rate and Rhythm: Normal rate and regular rhythm.      Heart sounds: Normal heart sounds.   Pulmonary:      Effort: Pulmonary effort is normal.      Breath sounds: Normal breath sounds.   Skin:     General: Skin is warm and dry.   Neurological:      General: No focal deficit present.      Mental Status: She is alert and oriented to person, place, and time. Mental status is at baseline.   Psychiatric:         Mood and Affect: Mood normal.         Behavior: Behavior normal.         Thought Content: Thought content normal.         Judgment: Judgment normal.      Problem List Items Addressed This Visit             ICD-10-CM    Hyperlipidemia E78.5    Type 2 diabetes mellitus with hyperglycemia, without long-term current use of insulin - Primary E11.65     Hypothyroidism E03.9     Other Visit Diagnoses         Codes    Hypertension, unspecified type     I10              Laura L Seaver, APRN-CNP

## 2024-10-18 RX ORDER — LISINOPRIL 2.5 MG/1
2.5 TABLET ORAL DAILY
Qty: 100 TABLET | Refills: 3 | Status: SHIPPED | OUTPATIENT
Start: 2024-10-18 | End: 2025-11-22

## 2024-12-05 ENCOUNTER — APPOINTMENT (OUTPATIENT)
Dept: PRIMARY CARE | Facility: CLINIC | Age: 79
End: 2024-12-05

## 2024-12-05 VITALS
BODY MASS INDEX: 26.55 KG/M2 | HEIGHT: 61 IN | SYSTOLIC BLOOD PRESSURE: 130 MMHG | HEART RATE: 80 BPM | WEIGHT: 140.6 LBS | DIASTOLIC BLOOD PRESSURE: 80 MMHG | TEMPERATURE: 97.7 F | OXYGEN SATURATION: 97 %

## 2024-12-05 DIAGNOSIS — I10 HYPERTENSION, UNSPECIFIED TYPE: ICD-10-CM

## 2024-12-05 DIAGNOSIS — E66.3 OVERWEIGHT (BMI 25.0-29.9): ICD-10-CM

## 2024-12-05 DIAGNOSIS — E11.65 TYPE 2 DIABETES MELLITUS WITH HYPERGLYCEMIA, WITHOUT LONG-TERM CURRENT USE OF INSULIN: Primary | ICD-10-CM

## 2024-12-05 LAB
POC FINGERSTICK BLOOD GLUCOSE: 132 MG/DL (ref 70–100)
POC HEMOGLOBIN A1C: 6.5 % (ref 4.2–6.5)

## 2024-12-05 PROCEDURE — 83036 HEMOGLOBIN GLYCOSYLATED A1C: CPT | Performed by: NURSE PRACTITIONER

## 2024-12-05 PROCEDURE — 99213 OFFICE O/P EST LOW 20 MIN: CPT | Performed by: NURSE PRACTITIONER

## 2024-12-05 PROCEDURE — 3075F SYST BP GE 130 - 139MM HG: CPT | Performed by: NURSE PRACTITIONER

## 2024-12-05 PROCEDURE — 3079F DIAST BP 80-89 MM HG: CPT | Performed by: NURSE PRACTITIONER

## 2024-12-05 PROCEDURE — 82962 GLUCOSE BLOOD TEST: CPT | Performed by: NURSE PRACTITIONER

## 2024-12-05 ASSESSMENT — ENCOUNTER SYMPTOMS
NEUROLOGICAL NEGATIVE: 1
CONSTITUTIONAL NEGATIVE: 1
MUSCULOSKELETAL NEGATIVE: 1
ENDOCRINE NEGATIVE: 1
PSYCHIATRIC NEGATIVE: 1
CARDIOVASCULAR NEGATIVE: 1
RESPIRATORY NEGATIVE: 1
GASTROINTESTINAL NEGATIVE: 1

## 2024-12-05 NOTE — PROGRESS NOTES
" Gianna Valenzuela is a 79 y.o. here for a diabetic follow up exam.     Pt states they are doing well. Following a low carbohydrate diet and is active.     Up to date with eye and foot exams, pcp visits.       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    Meds:  No meds all tlc     Aic 6.5% today - on her way back down so f/u 3mo          Lab Results   Component Value Date    POCGLU 132 (A) 12/05/2024    POCGLU 94 05/15/2024    POCGLU 115 (A) 02/14/2024    POCGLU 112 (A) 11/14/2023    HGBA1C 6.5 12/05/2024    HGBA1C 6.8 (A) 10/01/2024    HGBA1C 5.5 05/15/2024    HGBA1C 6.5 02/14/2024    HGBA1C 6.2 11/14/2023     /80 (BP Location: Left arm, Patient Position: Sitting, BP Cuff Size: Adult)   Pulse 80   Temp 36.5 °C (97.7 °F) (Temporal)   Ht 1.549 m (5' 1\")   Wt 63.8 kg (140 lb 9.6 oz)   SpO2 97%   BMI 26.57 kg/m²    Wt Readings from Last 5 Encounters:   12/05/24 63.8 kg (140 lb 9.6 oz)   10/17/24 65.5 kg (144 lb 6.4 oz)   10/01/24 64 kg (141 lb)   05/15/24 65.8 kg (145 lb)   03/07/24 64 kg (141 lb)          Lab Results   Component Value Date    ALBUMINUR 47.5 10/01/2024    CREATU 243.4 10/01/2024    MICROALBCREA 19.5 10/01/2024        Review of Systems   Constitutional: Negative.    HENT: Negative.     Respiratory: Negative.     Cardiovascular: Negative.    Gastrointestinal: Negative.    Endocrine: Negative.    Genitourinary: Negative.    Musculoskeletal: Negative.    Skin: Negative.    Neurological: Negative.    Psychiatric/Behavioral: Negative.          Physical Exam  Vitals and nursing note reviewed.   Constitutional:       General: She is not in acute distress.     Appearance: Normal appearance.   HENT:      Head: Normocephalic and atraumatic.   Eyes:      Extraocular Movements: Extraocular movements intact.      Pupils: Pupils are equal, round, and " reactive to light.   Cardiovascular:      Rate and Rhythm: Normal rate and regular rhythm.   Pulmonary:      Effort: Pulmonary effort is normal.      Breath sounds: Normal breath sounds.   Abdominal:      Palpations: Abdomen is soft.   Skin:     General: Skin is warm and dry.   Neurological:      General: No focal deficit present.      Mental Status: She is alert and oriented to person, place, and time.   Psychiatric:         Mood and Affect: Mood normal.         Behavior: Behavior normal.        Problem List Items Addressed This Visit             ICD-10-CM    Type 2 diabetes mellitus with hyperglycemia, without long-term current use of insulin - Primary E11.65    Relevant Orders    POCT glycosylated hemoglobin (Hb A1C) manually resulted (Completed)    POCT fingerstick glucose manually resulted (Completed)     Other Visit Diagnoses         Codes    Hypertension, unspecified type     I10    Overweight (BMI 25.0-29.9)     E66.3                Laura L Seaver, APRN-CNP

## 2025-03-06 ENCOUNTER — APPOINTMENT (OUTPATIENT)
Dept: PRIMARY CARE | Facility: CLINIC | Age: 80
End: 2025-03-06
Payer: MEDICARE

## 2025-03-11 DIAGNOSIS — E78.2 MIXED HYPERLIPIDEMIA: ICD-10-CM

## 2025-03-11 DIAGNOSIS — E03.9 HYPOTHYROIDISM, UNSPECIFIED TYPE: ICD-10-CM

## 2025-03-11 DIAGNOSIS — I10 HYPERTENSION, UNSPECIFIED TYPE: ICD-10-CM

## 2025-03-11 RX ORDER — LEVOTHYROXINE SODIUM 75 UG/1
75 TABLET ORAL
Qty: 90 TABLET | Refills: 0 | Status: SHIPPED | OUTPATIENT
Start: 2025-03-11 | End: 2025-03-13 | Stop reason: SDUPTHER

## 2025-03-11 RX ORDER — LISINOPRIL 2.5 MG/1
2.5 TABLET ORAL DAILY
Qty: 90 TABLET | Refills: 0 | Status: SHIPPED | OUTPATIENT
Start: 2025-03-11 | End: 2025-03-13 | Stop reason: SDUPTHER

## 2025-03-11 RX ORDER — PRAVASTATIN SODIUM 10 MG/1
10 TABLET ORAL 2 TIMES WEEKLY
Qty: 24 TABLET | Refills: 0 | Status: SHIPPED | OUTPATIENT
Start: 2025-03-13 | End: 2025-03-13 | Stop reason: SDUPTHER

## 2025-03-13 ENCOUNTER — APPOINTMENT (OUTPATIENT)
Dept: PRIMARY CARE | Facility: CLINIC | Age: 80
End: 2025-03-13
Payer: MEDICARE

## 2025-03-13 VITALS
TEMPERATURE: 97.7 F | SYSTOLIC BLOOD PRESSURE: 138 MMHG | HEIGHT: 61 IN | WEIGHT: 138.8 LBS | BODY MASS INDEX: 26.21 KG/M2 | DIASTOLIC BLOOD PRESSURE: 80 MMHG

## 2025-03-13 DIAGNOSIS — R23.8 OTHER SKIN CHANGES: ICD-10-CM

## 2025-03-13 DIAGNOSIS — E66.3 OVERWEIGHT (BMI 25.0-29.9): ICD-10-CM

## 2025-03-13 DIAGNOSIS — E03.9 HYPOTHYROIDISM, UNSPECIFIED TYPE: ICD-10-CM

## 2025-03-13 DIAGNOSIS — E11.65 TYPE 2 DIABETES MELLITUS WITH HYPERGLYCEMIA, WITHOUT LONG-TERM CURRENT USE OF INSULIN: Primary | ICD-10-CM

## 2025-03-13 DIAGNOSIS — E78.2 MIXED HYPERLIPIDEMIA: ICD-10-CM

## 2025-03-13 DIAGNOSIS — I10 HYPERTENSION, UNSPECIFIED TYPE: ICD-10-CM

## 2025-03-13 LAB
POC FINGERSTICK BLOOD GLUCOSE: 131 MG/DL (ref 70–100)
POC HEMOGLOBIN A1C: 6 % (ref 4.2–6.5)

## 2025-03-13 PROCEDURE — 83036 HEMOGLOBIN GLYCOSYLATED A1C: CPT | Performed by: NURSE PRACTITIONER

## 2025-03-13 PROCEDURE — 3079F DIAST BP 80-89 MM HG: CPT | Performed by: NURSE PRACTITIONER

## 2025-03-13 PROCEDURE — 82962 GLUCOSE BLOOD TEST: CPT | Performed by: NURSE PRACTITIONER

## 2025-03-13 PROCEDURE — 1159F MED LIST DOCD IN RCRD: CPT | Performed by: NURSE PRACTITIONER

## 2025-03-13 PROCEDURE — 3075F SYST BP GE 130 - 139MM HG: CPT | Performed by: NURSE PRACTITIONER

## 2025-03-13 PROCEDURE — 99213 OFFICE O/P EST LOW 20 MIN: CPT | Performed by: NURSE PRACTITIONER

## 2025-03-13 PROCEDURE — 1160F RVW MEDS BY RX/DR IN RCRD: CPT | Performed by: NURSE PRACTITIONER

## 2025-03-13 RX ORDER — LISINOPRIL 2.5 MG/1
2.5 TABLET ORAL DAILY
Qty: 30 TABLET | Refills: 0 | Status: SHIPPED | OUTPATIENT
Start: 2025-03-13 | End: 2026-04-17

## 2025-03-13 RX ORDER — PRAVASTATIN SODIUM 10 MG/1
TABLET ORAL
Qty: 25 TABLET | OUTPATIENT
Start: 2025-03-13

## 2025-03-13 RX ORDER — LEVOTHYROXINE SODIUM 75 UG/1
TABLET ORAL
Qty: 96 TABLET | OUTPATIENT
Start: 2025-03-13

## 2025-03-13 RX ORDER — LEVOTHYROXINE SODIUM 75 UG/1
75 TABLET ORAL
Qty: 90 TABLET | Refills: 0 | Status: SHIPPED | OUTPATIENT
Start: 2025-03-13

## 2025-03-13 RX ORDER — PRAVASTATIN SODIUM 10 MG/1
10 TABLET ORAL 2 TIMES WEEKLY
Qty: 8 TABLET | Refills: 0 | Status: SHIPPED | OUTPATIENT
Start: 2025-03-13 | End: 2026-03-13

## 2025-03-13 ASSESSMENT — ENCOUNTER SYMPTOMS
MUSCULOSKELETAL NEGATIVE: 1
RESPIRATORY NEGATIVE: 1
PSYCHIATRIC NEGATIVE: 1
CONSTITUTIONAL NEGATIVE: 1
GASTROINTESTINAL NEGATIVE: 1
CARDIOVASCULAR NEGATIVE: 1
ENDOCRINE NEGATIVE: 1
NEUROLOGICAL NEGATIVE: 1

## 2025-03-13 NOTE — PROGRESS NOTES
" Gianna Valenzuela is a 79 y.o. here for a diabetic follow up exam.     Pt states they are doing well. Following a low carbohydrate diet and is active.     Up to date with eye and foot exams, pcp visits.     Last wt 140  Last bmi 26.57  Last aic 6.5 (6.8, 5.5, 6.5, 6.2)    She is a very active 79 yr old  She is not on any OH she works on her dm status through diet, low cho high protein plan.  She is utd with eye care, checks her own feet no issues, and she had slight proteinuria at last urine microalbumin check 5/24 - she was started on ace - no issues. She has her annual exam and labwork due next month and she is reminded to get her urine microalbumin completed/ordered today while she is there getting Dr Nunn's annual labwork.     Refills. Called pharm for pt x 3 as she left her meds in [place].    She has a lesion on the right hip. It has been there for month or more. It does not bother her. It looks like cancer she had in past. She will get an appt with dr ellis - we called addendum - 2 weeks. This appears to need a biopsy.     Lab Results   Component Value Date    POCGLU 131 (A) 03/13/2025    POCGLU 132 (A) 12/05/2024    POCGLU 94 05/15/2024    POCGLU 115 (A) 02/14/2024    POCGLU 112 (A) 11/14/2023    HGBA1C 6.0 03/13/2025    HGBA1C 6.5 12/05/2024    HGBA1C 6.8 (A) 10/01/2024    HGBA1C 5.5 05/15/2024    HGBA1C 6.5 02/14/2024     /80 (BP Location: Left arm, Patient Position: Sitting, BP Cuff Size: Adult)   Temp 36.5 °C (97.7 °F) (Temporal)   Ht 1.549 m (5' 1\")   Wt 63 kg (138 lb 12.8 oz)   BMI 26.23 kg/m²    Wt Readings from Last 5 Encounters:   03/13/25 63 kg (138 lb 12.8 oz)   12/05/24 63.8 kg (140 lb 9.6 oz)   10/17/24 65.5 kg (144 lb 6.4 oz)   10/01/24 64 kg (141 lb)   05/15/24 65.8 kg (145 lb)          Lab Results   Component Value Date    ALBUMINUR 47.5 10/01/2024    CREATU 243.4 10/01/2024    MICROALBCREA 19.5 10/01/2024        Review of Systems   Constitutional: Negative.    HENT: Negative.   "   Respiratory: Negative.     Cardiovascular: Negative.    Gastrointestinal: Negative.    Endocrine: Negative.    Genitourinary: Negative.    Musculoskeletal: Negative.    Skin: Negative.    Neurological: Negative.    Psychiatric/Behavioral: Negative.          Physical Exam  Vitals and nursing note reviewed.   Constitutional:       Appearance: Normal appearance.   HENT:      Head: Normocephalic.   Eyes:      Pupils: Pupils are equal, round, and reactive to light.   Cardiovascular:      Rate and Rhythm: Normal rate and regular rhythm.      Heart sounds: Normal heart sounds.   Pulmonary:      Effort: Pulmonary effort is normal.      Breath sounds: Normal breath sounds.   Skin:     General: Skin is warm and dry.   Neurological:      General: No focal deficit present.      Mental Status: She is alert and oriented to person, place, and time. Mental status is at baseline.   Psychiatric:         Mood and Affect: Mood normal.         Behavior: Behavior normal.         Thought Content: Thought content normal.         Judgment: Judgment normal.      Problem List Items Addressed This Visit             ICD-10-CM    Hyperlipidemia E78.5    Relevant Medications    pravastatin (Pravachol) 10 mg tablet    Type 2 diabetes mellitus with hyperglycemia, without long-term current use of insulin - Primary E11.65    Relevant Orders    POCT glycosylated hemoglobin (Hb A1C) manually resulted (Completed)    POCT fingerstick glucose manually resulted (Completed)    Albumin-Creatinine Ratio, Urine Random    Hypothyroidism E03.9    Relevant Medications    levothyroxine (Synthroid, Levoxyl) 75 mcg tablet     Other Visit Diagnoses         Codes    Hypertension, unspecified type     I10    Relevant Medications    lisinopril 2.5 mg tablet    Overweight (BMI 25.0-29.9)     E66.3    Other skin changes     R23.8              Laura L Seaver, APRN-CNP

## 2025-03-14 ASSESSMENT — DERMATOLOGY QUALITY OF LIFE (QOL) ASSESSMENT
DATE THE QUALITY-OF-LIFE ASSESSMENT WAS COMPLETED: 67278
RATE HOW EMOTIONALLY BOTHERED YOU ARE BY YOUR SKIN PROBLEM (FOR EXAMPLE, WORRY, EMBARRASSMENT, FRUSTRATION): 6 - ALWAYS BOTHERED
RATE HOW EMOTIONALLY BOTHERED YOU ARE BY YOUR SKIN PROBLEM (FOR EXAMPLE, WORRY, EMBARRASSMENT, FRUSTRATION): 6 - ALWAYS BOTHERED
RATE HOW BOTHERED YOU ARE BY EFFECTS OF YOUR SKIN PROBLEMS ON YOUR ACTIVITIES (EG, GOING OUT, ACCOMPLISHING WHAT YOU WANT, WORK ACTIVITIES OR YOUR RELATIONSHIPS WITH OTHERS): 3
RATE HOW BOTHERED YOU ARE BY SYMPTOMS OF YOUR SKIN PROBLEM (EG, ITCHING, STINGING BURNING, HURTING OR SKIN IRRITATION): 4
RATE HOW BOTHERED YOU ARE BY EFFECTS OF YOUR SKIN PROBLEMS ON YOUR ACTIVITIES (EG, GOING OUT, ACCOMPLISHING WHAT YOU WANT, WORK ACTIVITIES OR YOUR RELATIONSHIPS WITH OTHERS): 3
WHAT SINGLE SKIN CONDITION LISTED BELOW IS THE PATIENT ANSWERING THE QUALITY-OF-LIFE ASSESSMENT QUESTIONS ABOUT: NONE OF THE ABOVE
WHAT SINGLE SKIN CONDITION LISTED BELOW IS THE PATIENT ANSWERING THE QUALITY-OF-LIFE ASSESSMENT QUESTIONS ABOUT: NONE OF THE ABOVE
RATE HOW BOTHERED YOU ARE BY SYMPTOMS OF YOUR SKIN PROBLEM (EG, ITCHING, STINGING BURNING, HURTING OR SKIN IRRITATION): 4

## 2025-03-14 ASSESSMENT — PATIENT GLOBAL ASSESSMENT (PGA): WHAT IS THE PGA: PATIENT GLOBAL ASSESSMENT:  3 - MODERATE

## 2025-03-19 ENCOUNTER — APPOINTMENT (OUTPATIENT)
Dept: DERMATOLOGY | Facility: CLINIC | Age: 80
End: 2025-03-19
Payer: MEDICARE

## 2025-03-19 DIAGNOSIS — D48.5 NEOPLASM OF UNCERTAIN BEHAVIOR OF SKIN: Primary | ICD-10-CM

## 2025-03-19 PROCEDURE — 11102 TANGNTL BX SKIN SINGLE LES: CPT | Performed by: NURSE PRACTITIONER

## 2025-03-19 PROCEDURE — 1159F MED LIST DOCD IN RCRD: CPT | Performed by: NURSE PRACTITIONER

## 2025-03-19 PROCEDURE — 1160F RVW MEDS BY RX/DR IN RCRD: CPT | Performed by: NURSE PRACTITIONER

## 2025-03-19 PROCEDURE — 99213 OFFICE O/P EST LOW 20 MIN: CPT | Performed by: NURSE PRACTITIONER

## 2025-03-19 NOTE — Clinical Note
March 19, 2025     No Recipients    Patient: Gianna Valenzuela   YOB: 1945   Date of Visit: 3/19/2025       Dear Dr. Barnhart Recipients:    Thank you for referring Gianna Valenzuela to me for evaluation. Below are my notes for this consultation.  If you have questions, please do not hesitate to call me. I look forward to following your patient along with you.       Sincerely,     Susan L Mayne, APRN-CNP      CC: No Recipients  ______________________________________________________________________________________    Subjective    Gianna Valenzuela is a 79 y.o. female who presents for the following: Suspicious Skin Lesion (Spot on right hip/buttocks.  Pt states she thinks it is a squamous cell carcinoma.  Pt is going to [place] in 1.5 weeks.).     Review of Systems:  No other skin or systemic complaints other than what is documented elsewhere in the note.    The following portions of the chart were reviewed this encounter and updated as appropriate:  Tobacco  Allergies  Meds  Problems  Med Hx  Surg Hx  Fam Hx       Skin Cancer History  Biopsy Date Type Location Status   5/21/24 SCC Left Medial Chest Treatment Complete  6/7/24     Specialty Problems    None    Past Medical History:  Gianna Valenzuela  has a past medical history of Diabetes (Multi), Hyperlipidemia, and Hypothyroidism.    Past Surgical History:  Gianna Valenzuela  has no past surgical history on file.    Family History:  Patient family history includes Cancer in her son; Diabetes in her mother.    Social History:  Gianna Valenzuela  reports that she has been smoking cigarettes. She has been exposed to tobacco smoke. She has never used smokeless tobacco. No history on file for alcohol use and drug use.    Allergies:  Meperidine    Current Medications / CAM's:    Current Outpatient Medications:     b complex vitamins capsule, Take 1 capsule by mouth once daily., Disp: , Rfl:     cholecalciferol (Vitamin D-3) 50 MCG (2000 UT) tablet, Take by mouth  once daily., Disp: , Rfl:     diphenhydrAMINE (Benadryl Allergy) 25 mg tablet, Take 1 tablet (25 mg) by mouth as needed at bedtime for itching or sleep., Disp: , Rfl:     ferrous sulfate, 325 mg ferrous sulfate, tablet, Take 1 tablet by mouth once daily with breakfast., Disp: 90 tablet, Rfl: 3    levothyroxine (Synthroid, Levoxyl) 75 mcg tablet, Take 1 tablet (75 mcg) by mouth once daily in the morning. Take before meals. TAKE ONE EXTR TAB ON SUNDAYS. TAKE ON EMPTY STOMACH 1/2 HR BEFORE ANY FOOD OR MEDS; RETEST TSH IN 2 MONTHS = MAY 2024., Disp: 90 tablet, Rfl: 0    lisinopril 2.5 mg tablet, Take 1 tablet (2.5 mg) by mouth once daily., Disp: 30 tablet, Rfl: 0    MAGNESIUM ORAL, Take 1 tablet by mouth once daily., Disp: , Rfl:     multivitamin tablet, Take by mouth., Disp: , Rfl:     pravastatin (Pravachol) 10 mg tablet, Take 1 tablet (10 mg) by mouth 2 times a week. Take one tablet 2 x weekly, Disp: 8 tablet, Rfl: 0     Objective  Well appearing patient in no apparent distress; mood and affect are within normal limits.    A focused skin examination was performed. All findings within normal limits unless otherwise noted below. Declined total body skin exam.     Assessment/Plan  1. Neoplasm of uncertain behavior of skin  Right Buttock  1.3 cm pink nodule with central hyperkeratotic crust.               Lesion biopsy  Type of biopsy: tangential    Informed consent: discussed and consent obtained    Timeout: patient name, date of birth, surgical site, and procedure verified    Procedure prep:  Patient was prepped and draped  Anesthesia: the lesion was anesthetized in a standard fashion    Anesthetic:  1% lidocaine w/ epinephrine 1-100,000 local infiltration  Instrument used: DermaBlade    Hemostasis achieved with: aluminum chloride    Outcome: patient tolerated procedure well    Post-procedure details: sterile dressing applied and wound care instructions given    Dressing type: petrolatum and bandage      Specimen 1 -  Dermatopathology- DERM LAB  Differential Diagnosis: r/o nmsc  Check Margins Yes/No?:    Comments:    Dermpath Lab: Routine Histopathology (formalin-fixed tissue)    -  Discussed differential with patient.   - Given uncertainty of clinical diagnosis, a biopsy is recommended in clinic today.   - The patient expressed understanding, is in agreement with this plan, and wishes to proceed with biopsy.   - Oral and written wound care instructions provided.   - Advised the patient that the office will call within 2 weeks to discuss biopsy results.

## 2025-03-19 NOTE — PROGRESS NOTES
Subjective     Gianna Valenzuela is a 79 y.o. female who presents for the following: Suspicious Skin Lesion (Spot on right hip/buttocks.  Pt states she thinks it is a squamous cell carcinoma.  Pt is going to [place] in 1.5 weeks.).     Review of Systems:  No other skin or systemic complaints other than what is documented elsewhere in the note.    The following portions of the chart were reviewed this encounter and updated as appropriate:  Tobacco  Allergies  Meds  Problems  Med Hx  Surg Hx  Fam Hx       Skin Cancer History  Biopsy Date Type Location Status   5/21/24 SCC Left Medial Chest Treatment Complete  6/7/24     Specialty Problems    None    Past Medical History:  Gianna Valenzuela  has a past medical history of Diabetes (Multi), Hyperlipidemia, and Hypothyroidism.    Past Surgical History:  Gianna Valenzuela  has no past surgical history on file.    Family History:  Patient family history includes Cancer in her son; Diabetes in her mother.    Social History:  Gianna Valenzuela  reports that she has been smoking cigarettes. She has been exposed to tobacco smoke. She has never used smokeless tobacco. No history on file for alcohol use and drug use.    Allergies:  Meperidine    Current Medications / CAM's:    Current Outpatient Medications:     b complex vitamins capsule, Take 1 capsule by mouth once daily., Disp: , Rfl:     cholecalciferol (Vitamin D-3) 50 MCG (2000 UT) tablet, Take by mouth once daily., Disp: , Rfl:     diphenhydrAMINE (Benadryl Allergy) 25 mg tablet, Take 1 tablet (25 mg) by mouth as needed at bedtime for itching or sleep., Disp: , Rfl:     ferrous sulfate, 325 mg ferrous sulfate, tablet, Take 1 tablet by mouth once daily with breakfast., Disp: 90 tablet, Rfl: 3    levothyroxine (Synthroid, Levoxyl) 75 mcg tablet, Take 1 tablet (75 mcg) by mouth once daily in the morning. Take before meals. TAKE ONE EXTR TAB ON SUNDAYS. TAKE ON EMPTY STOMACH 1/2 HR BEFORE ANY FOOD OR MEDS; RETEST TSH IN 2  MONTHS = MAY 2024., Disp: 90 tablet, Rfl: 0    lisinopril 2.5 mg tablet, Take 1 tablet (2.5 mg) by mouth once daily., Disp: 30 tablet, Rfl: 0    MAGNESIUM ORAL, Take 1 tablet by mouth once daily., Disp: , Rfl:     multivitamin tablet, Take by mouth., Disp: , Rfl:     pravastatin (Pravachol) 10 mg tablet, Take 1 tablet (10 mg) by mouth 2 times a week. Take one tablet 2 x weekly, Disp: 8 tablet, Rfl: 0     Objective   Well appearing patient in no apparent distress; mood and affect are within normal limits.    A focused skin examination was performed. All findings within normal limits unless otherwise noted below. Declined total body skin exam.     Assessment/Plan   1. Neoplasm of uncertain behavior of skin  Right Buttock  1.3 cm pink nodule with central hyperkeratotic crust.               Lesion biopsy  Type of biopsy: tangential    Informed consent: discussed and consent obtained    Timeout: patient name, date of birth, surgical site, and procedure verified    Procedure prep:  Patient was prepped and draped  Anesthesia: the lesion was anesthetized in a standard fashion    Anesthetic:  1% lidocaine w/ epinephrine 1-100,000 local infiltration  Instrument used: DermaBlade    Hemostasis achieved with: aluminum chloride    Outcome: patient tolerated procedure well    Post-procedure details: sterile dressing applied and wound care instructions given    Dressing type: petrolatum and bandage      Specimen 1 - Dermatopathology- DERM LAB  Differential Diagnosis: r/o nmsc  Check Margins Yes/No?:    Comments:    Dermpath Lab: Routine Histopathology (formalin-fixed tissue)    -  Discussed differential with patient.   - Given uncertainty of clinical diagnosis, a biopsy is recommended in clinic today.   - The patient expressed understanding, is in agreement with this plan, and wishes to proceed with biopsy.   - Oral and written wound care instructions provided.   - Advised the patient that the office will call within 2 weeks to  discuss biopsy results.     Follow up for a total body skin exam. Please call me if there are any changes or development of concerning symptoms (lesion/skin condition is changing, bleeding, enlarging, or worsening).

## 2025-03-21 ENCOUNTER — HOSPITAL ENCOUNTER (OUTPATIENT)
Dept: RADIOLOGY | Facility: CLINIC | Age: 80
Discharge: HOME | End: 2025-03-21
Payer: MEDICARE

## 2025-03-21 DIAGNOSIS — E04.2 MULTIPLE THYROID NODULES: ICD-10-CM

## 2025-03-21 PROCEDURE — 76536 US EXAM OF HEAD AND NECK: CPT

## 2025-03-27 DIAGNOSIS — C44.92 SCC (SQUAMOUS CELL CARCINOMA): ICD-10-CM

## 2025-04-01 ENCOUNTER — APPOINTMENT (OUTPATIENT)
Dept: PRIMARY CARE | Facility: CLINIC | Age: 80
End: 2025-04-01
Payer: MEDICARE

## 2025-04-18 DIAGNOSIS — E78.2 MIXED HYPERLIPIDEMIA: ICD-10-CM

## 2025-04-18 RX ORDER — PRAVASTATIN SODIUM 10 MG/1
TABLET ORAL
Qty: 8 TABLET | Refills: 0 | Status: SHIPPED | OUTPATIENT
Start: 2025-04-18

## 2025-04-22 DIAGNOSIS — I10 HYPERTENSION, UNSPECIFIED TYPE: ICD-10-CM

## 2025-04-22 RX ORDER — LISINOPRIL 2.5 MG/1
2.5 TABLET ORAL DAILY
Qty: 30 TABLET | Refills: 0 | Status: SHIPPED | OUTPATIENT
Start: 2025-04-22 | End: 2026-05-27

## 2025-04-29 ENCOUNTER — APPOINTMENT (OUTPATIENT)
Dept: PRIMARY CARE | Facility: CLINIC | Age: 80
End: 2025-04-29
Payer: MEDICARE

## 2025-04-29 VITALS
DIASTOLIC BLOOD PRESSURE: 84 MMHG | HEART RATE: 68 BPM | HEIGHT: 61 IN | SYSTOLIC BLOOD PRESSURE: 124 MMHG | OXYGEN SATURATION: 90 % | BODY MASS INDEX: 25.6 KG/M2 | TEMPERATURE: 97.7 F | WEIGHT: 135.6 LBS

## 2025-04-29 DIAGNOSIS — F17.200 SMOKER: ICD-10-CM

## 2025-04-29 DIAGNOSIS — R23.8 OTHER SKIN CHANGES: ICD-10-CM

## 2025-04-29 DIAGNOSIS — E78.2 MIXED HYPERLIPIDEMIA: ICD-10-CM

## 2025-04-29 DIAGNOSIS — R82.998 OTHER ABNORMAL FINDINGS IN URINE: ICD-10-CM

## 2025-04-29 DIAGNOSIS — R91.1 LUNG NODULE: ICD-10-CM

## 2025-04-29 DIAGNOSIS — E04.2 MULTIPLE THYROID NODULES: ICD-10-CM

## 2025-04-29 DIAGNOSIS — E03.9 HYPOTHYROIDISM, UNSPECIFIED TYPE: ICD-10-CM

## 2025-04-29 DIAGNOSIS — E66.3 OVERWEIGHT (BMI 25.0-29.9): ICD-10-CM

## 2025-04-29 DIAGNOSIS — E55.9 VITAMIN D DEFICIENCY: ICD-10-CM

## 2025-04-29 DIAGNOSIS — J44.9 CHRONIC OBSTRUCTIVE PULMONARY DISEASE, UNSPECIFIED COPD TYPE (MULTI): ICD-10-CM

## 2025-04-29 DIAGNOSIS — E11.65 TYPE 2 DIABETES MELLITUS WITH HYPERGLYCEMIA, WITHOUT LONG-TERM CURRENT USE OF INSULIN: ICD-10-CM

## 2025-04-29 DIAGNOSIS — I10 HYPERTENSION, UNSPECIFIED TYPE: ICD-10-CM

## 2025-04-29 DIAGNOSIS — M15.9 GENERALIZED OSTEOARTHRITIS: ICD-10-CM

## 2025-04-29 DIAGNOSIS — Z00.00 MEDICARE ANNUAL WELLNESS VISIT, SUBSEQUENT: Primary | ICD-10-CM

## 2025-04-29 DIAGNOSIS — Z00.00 WELLNESS EXAMINATION: ICD-10-CM

## 2025-04-29 DIAGNOSIS — Z85.828 HISTORY OF SKIN CANCER: ICD-10-CM

## 2025-04-29 DIAGNOSIS — R79.9 ABNORMAL FINDING OF BLOOD CHEMISTRY, UNSPECIFIED: ICD-10-CM

## 2025-04-29 DIAGNOSIS — E04.1 NODULE OF LEFT LOBE OF THYROID GLAND: ICD-10-CM

## 2025-04-29 PROCEDURE — 99213 OFFICE O/P EST LOW 20 MIN: CPT | Performed by: INTERNAL MEDICINE

## 2025-04-29 PROCEDURE — G0439 PPPS, SUBSEQ VISIT: HCPCS | Performed by: INTERNAL MEDICINE

## 2025-04-29 PROCEDURE — 1170F FXNL STATUS ASSESSED: CPT | Performed by: INTERNAL MEDICINE

## 2025-04-29 PROCEDURE — 1159F MED LIST DOCD IN RCRD: CPT | Performed by: INTERNAL MEDICINE

## 2025-04-29 PROCEDURE — 3074F SYST BP LT 130 MM HG: CPT | Performed by: INTERNAL MEDICINE

## 2025-04-29 PROCEDURE — 99397 PER PM REEVAL EST PAT 65+ YR: CPT | Performed by: INTERNAL MEDICINE

## 2025-04-29 PROCEDURE — 1124F ACP DISCUSS-NO DSCNMKR DOCD: CPT | Performed by: INTERNAL MEDICINE

## 2025-04-29 PROCEDURE — 3079F DIAST BP 80-89 MM HG: CPT | Performed by: INTERNAL MEDICINE

## 2025-04-29 RX ORDER — LISINOPRIL 2.5 MG/1
2.5 TABLET ORAL DAILY
Qty: 90 TABLET | Refills: 1 | Status: SHIPPED | OUTPATIENT
Start: 2025-04-29 | End: 2026-06-03

## 2025-04-29 RX ORDER — PRAVASTATIN SODIUM 10 MG/1
10 TABLET ORAL WEEKLY
Qty: 12 TABLET | Refills: 2 | Status: SHIPPED | OUTPATIENT
Start: 2025-04-29

## 2025-04-29 ASSESSMENT — ENCOUNTER SYMPTOMS
CHOKING: 0
GASTROINTESTINAL NEGATIVE: 1
FATIGUE: 0
SHORTNESS OF BREATH: 0
NUMBNESS: 0
EYE ITCHING: 0
FEVER: 0
APPETITE CHANGE: 0
ARTHRALGIAS: 0
UNEXPECTED WEIGHT CHANGE: 0
EYE DISCHARGE: 0
DIZZINESS: 0
ALLERGIC/IMMUNOLOGIC NEGATIVE: 1
ACTIVITY CHANGE: 0
COUGH: 0
PSYCHIATRIC NEGATIVE: 1
EYE PAIN: 0
PHOTOPHOBIA: 0
CHEST TIGHTNESS: 0
DIAPHORESIS: 0
EYE REDNESS: 0
ENDOCRINE NEGATIVE: 1
WHEEZING: 0
HEADACHES: 0
FACIAL ASYMMETRY: 0
STRIDOR: 0
APNEA: 0
LIGHT-HEADEDNESS: 0
CHILLS: 0
HEMATOLOGIC/LYMPHATIC NEGATIVE: 1
PALPITATIONS: 0

## 2025-04-29 ASSESSMENT — ACTIVITIES OF DAILY LIVING (ADL)
TAKING_MEDICATION: INDEPENDENT
MANAGING_FINANCES: INDEPENDENT
BATHING: INDEPENDENT
GROCERY_SHOPPING: INDEPENDENT
DOING_HOUSEWORK: INDEPENDENT
DRESSING: INDEPENDENT

## 2025-04-29 ASSESSMENT — PATIENT HEALTH QUESTIONNAIRE - PHQ9
2. FEELING DOWN, DEPRESSED OR HOPELESS: NOT AT ALL
1. LITTLE INTEREST OR PLEASURE IN DOING THINGS: NOT AT ALL
SUM OF ALL RESPONSES TO PHQ9 QUESTIONS 1 AND 2: 0

## 2025-04-29 NOTE — ASSESSMENT & PLAN NOTE
See us   Orders:    CT chest wo IV contrast; Future    TSH with reflex to Free T4 if abnormal; Future    Urinalysis with Reflex Culture and Microscopic; Future

## 2025-04-29 NOTE — ASSESSMENT & PLAN NOTE
Time for labs to evaluate results     Orders:    pravastatin (Pravachol) 10 mg tablet; Take 1 tablet (10 mg) by mouth 1 (one) time per week.    CT chest wo IV contrast; Future    TSH with reflex to Free T4 if abnormal; Future    Urinalysis with Reflex Culture and Microscopic; Future

## 2025-04-29 NOTE — ASSESSMENT & PLAN NOTE
Orders:    CT chest wo IV contrast; Future    CBC; Future    Lipid Panel; Future    Comprehensive Metabolic Panel; Future    TSH with reflex to Free T4 if abnormal; Future    Urinalysis with Reflex Culture and Microscopic; Future

## 2025-04-29 NOTE — ASSESSMENT & PLAN NOTE
Ongoing   Orders:    CT chest wo IV contrast; Future    TSH with reflex to Free T4 if abnormal; Future    Urinalysis with Reflex Culture and Microscopic; Future

## 2025-04-29 NOTE — ASSESSMENT & PLAN NOTE
Demarco neg   Orders:    CT chest wo IV contrast; Future    TSH with reflex to Free T4 if abnormal; Future    Urinalysis with Reflex Culture and Microscopic; Future

## 2025-04-29 NOTE — ASSESSMENT & PLAN NOTE
Ct due   Orders:    CT chest wo IV contrast; Future    TSH with reflex to Free T4 if abnormal; Future    Urinalysis with Reflex Culture and Microscopic; Future

## 2025-04-29 NOTE — ASSESSMENT & PLAN NOTE
Time for labs to evaluate results     Orders:    CT chest wo IV contrast; Future    TSH with reflex to Free T4 if abnormal; Future    Urinalysis with Reflex Culture and Microscopic; Future

## 2025-04-29 NOTE — ASSESSMENT & PLAN NOTE
Great diet controlled  Orders:    CT chest wo IV contrast; Future    TSH with reflex to Free T4 if abnormal; Future    Urinalysis with Reflex Culture and Microscopic; Future

## 2025-04-29 NOTE — ASSESSMENT & PLAN NOTE
Blood pressure at goal    Orders:    lisinopril 2.5 mg tablet; Take 1 tablet (2.5 mg) by mouth once daily.    CT chest wo IV contrast; Future    TSH with reflex to Free T4 if abnormal; Future    Urinalysis with Reflex Culture and Microscopic; Future

## 2025-04-29 NOTE — ASSESSMENT & PLAN NOTE
Orders:    CT chest wo IV contrast; Future    TSH with reflex to Free T4 if abnormal; Future    Urinalysis with Reflex Culture and Microscopic; Future     (2) potential problem

## 2025-04-29 NOTE — ASSESSMENT & PLAN NOTE
See derm  Orders:    CT chest wo IV contrast; Future    TSH with reflex to Free T4 if abnormal; Future    Urinalysis with Reflex Culture and Microscopic; Future

## 2025-04-29 NOTE — PROGRESS NOTES
Subjective   Reason for Visit: Gianna Valenzuela is an 79 y.o. female here for a Medicare Wellness visit.     Past Medical, Surgical, and Family History reviewed and updated in chart.    Reviewed all medications by prescribing practitioner or clinical pharmacist (such as prescriptions, OTCs, herbal therapies and supplements) and documented in the medical record.    HPI doing well   Feels well   Good otherwise     Allergies   Allergen Reactions   • Demerol Hcl [Meperidine]   • Other     Current Outpatient Medications on File Prior to Visit   Medication Sig Dispense Refill   • pravastatin (PRAVACHOL) 10 MG tablet 1 tablet   • pravastatin (PRAVACHOL) 10 MG tablet TAKE ONE TABLET BY MOUTH TWICE A WEEK   • levothyroxine (SYNTHROID) 50 MCG tablet Take 50 mcg by mouth daily 1   • vitamin D (CHOLECALCIFEROL) 1000 UNIT TABS tablet Take 1,000 Units by mouth daily   • ibuprofen (ADVIL;MOTRIN) 800 MG tablet Take 1 tablet by mouth every 8 hours as needed for Pain Take with food. 21 tablet 0   Lisinopril 2.5 po,every day      Past Medical History:   Diagnosis Date   • Hypothyroidism   Arthritis   Social History     Tobacco Use   • Smoking status: Current Every Day Smoker   • Smokeless tobacco: Never Used   Substance Use Topics   • Alcohol use: Yes     Patient Care Team:  Tu Nunn MD as PCP - General  Tu Nunn MD as PCP - [employer] PCP     Review of Systems   Constitutional:  Negative for activity change, appetite change, chills, diaphoresis, fatigue, fever and unexpected weight change.   HENT: Negative.     Eyes:  Negative for photophobia, pain, discharge, redness, itching and visual disturbance.   Respiratory:  Negative for apnea, cough, choking, chest tightness, shortness of breath, wheezing and stridor.    Cardiovascular:  Negative for chest pain, palpitations and leg swelling.   Gastrointestinal: Negative.    Endocrine: Negative.    Genitourinary: Negative.    Musculoskeletal:  Negative for  "arthralgias.   Skin: Negative.    Allergic/Immunologic: Negative.    Neurological:  Negative for dizziness, facial asymmetry, light-headedness, numbness and headaches.   Hematological: Negative.    Psychiatric/Behavioral: Negative.         Objective   Vitals:  /84 (BP Location: Left arm, Patient Position: Sitting, BP Cuff Size: Adult)   Pulse 68   Temp 36.5 °C (97.7 °F) (Temporal)   Ht 1.549 m (5' 1\")   Wt 61.5 kg (135 lb 9.6 oz)   SpO2 90%   BMI 25.62 kg/m²       Physical Exam  HENT:      Head: Normocephalic.      Right Ear: Tympanic membrane normal.      Nose: Nose normal.      Mouth/Throat:      Mouth: Mucous membranes are moist.   Eyes:      Pupils: Pupils are equal, round, and reactive to light.   Cardiovascular:      Rate and Rhythm: Normal rate and regular rhythm.   Pulmonary:      Effort: Pulmonary effort is normal.   Abdominal:      General: Abdomen is flat. Bowel sounds are normal.   Musculoskeletal:         General: Normal range of motion.   Skin:     General: Skin is warm.      Capillary Refill: Capillary refill takes less than 2 seconds.      Findings: Lesion present.   Neurological:      Mental Status: She is alert and oriented to person, place, and time.   Psychiatric:         Mood and Affect: Mood normal.       Assessment & Plan  Medicare annual wellness visit, subsequent    Orders:  •  CT chest wo IV contrast; Future  •  TSH with reflex to Free T4 if abnormal; Future  •  Urinalysis with Reflex Culture and Microscopic; Future    Type 2 diabetes mellitus with hyperglycemia, without long-term current use of insulin  Great diet controlled  Orders:  •  CT chest wo IV contrast; Future  •  TSH with reflex to Free T4 if abnormal; Future  •  Urinalysis with Reflex Culture and Microscopic; Future    Hypertension, unspecified type  Blood pressure at goal    Orders:  •  lisinopril 2.5 mg tablet; Take 1 tablet (2.5 mg) by mouth once daily.  •  CT chest wo IV contrast; Future  •  TSH with reflex to " Free T4 if abnormal; Future  •  Urinalysis with Reflex Culture and Microscopic; Future    Overweight (BMI 25.0-29.9)  Good   Orders:  •  CT chest wo IV contrast; Future  •  TSH with reflex to Free T4 if abnormal; Future  •  Urinalysis with Reflex Culture and Microscopic; Future    Hypothyroidism, unspecified type  Time for labs to evaluate results     Orders:  •  CT chest wo IV contrast; Future  •  TSH with reflex to Free T4 if abnormal; Future  •  Urinalysis with Reflex Culture and Microscopic; Future    Mixed hyperlipidemia  Time for labs to evaluate results     Orders:  •  pravastatin (Pravachol) 10 mg tablet; Take 1 tablet (10 mg) by mouth 1 (one) time per week.  •  CT chest wo IV contrast; Future  •  TSH with reflex to Free T4 if abnormal; Future  •  Urinalysis with Reflex Culture and Microscopic; Future    Other skin changes  See derm  Orders:  •  CT chest wo IV contrast; Future  •  TSH with reflex to Free T4 if abnormal; Future  •  Urinalysis with Reflex Culture and Microscopic; Future    Chronic obstructive pulmonary disease, unspecified COPD type (Multi)  Ongoing smoking she is very aware of risk  Orders:  •  CT chest wo IV contrast; Future  •  TSH with reflex to Free T4 if abnormal; Future  •  Urinalysis with Reflex Culture and Microscopic; Future    Nodule of left lobe of thyroid gland  Demarco neg   Orders:  •  CT chest wo IV contrast; Future  •  TSH with reflex to Free T4 if abnormal; Future  •  Urinalysis with Reflex Culture and Microscopic; Future    Lung nodule  Ct due   Orders:  •  CT chest wo IV contrast; Future  •  TSH with reflex to Free T4 if abnormal; Future  •  Urinalysis with Reflex Culture and Microscopic; Future    Generalized osteoarthritis  Ok   Orders:  •  CT chest wo IV contrast; Future  •  TSH with reflex to Free T4 if abnormal; Future  •  Urinalysis with Reflex Culture and Microscopic; Future    Smoker  Ongoing   Orders:  •  CT chest wo IV contrast; Future  •  TSH with reflex to Free T4 if  abnormal; Future  •  Urinalysis with Reflex Culture and Microscopic; Future    Wellness examination    Orders:  •  CT chest wo IV contrast; Future  •  CBC; Future  •  Lipid Panel; Future  •  Comprehensive Metabolic Panel; Future  •  TSH with reflex to Free T4 if abnormal; Future  •  Urinalysis with Reflex Culture and Microscopic; Future    Multiple thyroid nodules  See us   Orders:  •  CT chest wo IV contrast; Future  •  TSH with reflex to Free T4 if abnormal; Future  •  Urinalysis with Reflex Culture and Microscopic; Future    History of skin cancer  Ok   Orders:  •  CT chest wo IV contrast; Future  •  TSH with reflex to Free T4 if abnormal; Future  •  Urinalysis with Reflex Culture and Microscopic; Future    Vitamin D deficiency  Time for labs to evaluate results     Orders:  •  CT chest wo IV contrast; Future  •  Vitamin D 25-Hydroxy,Total (for eval of Vitamin D levels); Future  •  TSH with reflex to Free T4 if abnormal; Future  •  Urinalysis with Reflex Culture and Microscopic; Future    Abnormal finding of blood chemistry, unspecified    Orders:  •  CBC; Future    Other abnormal findings in urine    Orders:  •  Urinalysis with Reflex Culture and Microscopic; Future

## 2025-04-29 NOTE — ASSESSMENT & PLAN NOTE
Ok   Orders:    CT chest wo IV contrast; Future    TSH with reflex to Free T4 if abnormal; Future    Urinalysis with Reflex Culture and Microscopic; Future

## 2025-04-29 NOTE — ASSESSMENT & PLAN NOTE
Good   Orders:    CT chest wo IV contrast; Future    TSH with reflex to Free T4 if abnormal; Future    Urinalysis with Reflex Culture and Microscopic; Future

## 2025-04-29 NOTE — ASSESSMENT & PLAN NOTE
Ongoing smoking she is very aware of risk  Orders:    CT chest wo IV contrast; Future    TSH with reflex to Free T4 if abnormal; Future    Urinalysis with Reflex Culture and Microscopic; Future

## 2025-05-13 ENCOUNTER — TELEPHONE (OUTPATIENT)
Dept: PRIMARY CARE | Facility: CLINIC | Age: 80
End: 2025-05-13
Payer: MEDICARE

## 2025-05-13 NOTE — TELEPHONE ENCOUNTER
Per Maikol Cerna denied the CT of chest but unsure of the rationale. Case number [id number]. Requesting a Peer to Peer by calling [phone].

## 2025-05-16 NOTE — TELEPHONE ENCOUNTER
Pt is aware [facility] did not approved CT and cancelled test per  precert.

## 2025-05-20 ENCOUNTER — APPOINTMENT (OUTPATIENT)
Dept: DERMATOLOGY | Facility: CLINIC | Age: 80
End: 2025-05-20
Payer: MEDICARE

## 2025-05-20 VITALS — DIASTOLIC BLOOD PRESSURE: 90 MMHG | HEART RATE: 58 BPM | SYSTOLIC BLOOD PRESSURE: 174 MMHG

## 2025-05-20 DIAGNOSIS — C44.529 SQUAMOUS CELL CANCER OF SKIN OF BUTTOCK: ICD-10-CM

## 2025-05-20 NOTE — PROGRESS NOTES
Office Visit Note  Date: 5/20/2025  Surgeon:  Kaleb Arzola MD  Office Location:  [id number] [address]  Dept: [phone]  Dept Fax: [phone]  Referring Provider: Susan L Mayne, JOSÉ MIGUEL-KHADRA  [address] [place] [address]    Subjective   Gianna Valenzuela is a 79 y.o. female who presents for the following: MOHS Surgery (Right Buttock)    According to the patient, the lesion has been present for approximately 6 months at the time of diagnosis.  The lesion is itchy and painful.  The lesion has not been treated previously.    The patient does not have a pacemaker / defibrillator.  The patient does not have a heart valve / joint replacement.    The patient is not on blood thinners.  The patient does not have a history of hepatitis B or C.  The patient does not have a history of HIV.  The patient does not have a history of immunosuppression (e.g. organ transplantation, malignancy, medications)    Review of Systems:  No other skin or systemic complaints other than what is documented elsewhere in the note.    MEDICAL HISTORY: clinically relevant history including significant past medical history, medications and allergies was reviewed and documented in Epic.    Objective   Well appearing patient in no apparent distress; mood and affect are within normal limits.  Vital signs: See record.  Noted on the   Right Buttock  Is a 2.2 x 1.6 cm scar    The patient confirmed the identified site.    Discussion:  The nature of the diagnosis was explained. The lesion is a skin cancer.  It has a risk of local growth and distant spread. The condition is associated with sun exposure.  Warning signs of non-melanoma skin cancer discussed. Patient was instructed to perform monthly self skin examination.  We recommended that the patient have regular full skin exams given an increased risk of subsequent skin cancers. The patient was instructed to use sun  protective behaviors including use of broad spectrum sunscreens and sun protective clothing to reduce risk of skin cancers.      Risks, benefits, side effects of Mohs surgery were discussed with patient and the patient voiced understanding.  It was explained that even though the cure rate of Mohs is very high it is not 100%. Risks of surgery including but not limited to bleeding, infection, numbness, nerve damage, and scar were reviewed.  Discussion included wound care requirements, activity restrictions, likely scar outcome and time to heal.     After Mohs surgery, the defect may need to be repaired surgically and the scar may be longer than the original lesion.  Reconstruction options, risks, and benefits were reviewed including second intention healing, linear repair (4-1 ratio was explained), local flaps, skin grafts, cartilage grafts and interpolation flaps (the need for multiple surgeries was explained). Possible outcomes were reviewed including likely scar appearance, failure of flap survival, infection, bleeding and the need for revision surgery.     The pathology was reviewed, the photograph was reviewed, and the referring physician's note was reviewed.    Patient elected for Mohs surgery.     IBozena RN am scribing for, and in the presence of Kaleb Arzola MD    IKaleb MD, personally performed the services described in the documentation as scribed by  Bozena Beckman RN   in my presence, and confirm it is both accurate and complete.

## 2025-05-20 NOTE — PROGRESS NOTES
Mohs Surgery Operative Note    Date of Surgery:  5/20/2025  Surgeon:  Kaleb Arzola MD  Office Location:  [address] [facility]  [address] [address]  Dept: [phone]  Dept Fax: [phone]  Referring Provider: Susan L Mayne, JOSÉ MIGUEL-KHADRA  [address] [address]      Assessment/Plan   Pre-procedure:   Obtained informed consent: written from patient  The surgical site was identified and confirmed with the patient.     Intra-operative:   Audible time out called at : 10:41 AM 05/20/25  by: Bozena Beckman RN   Verified patient name, birthdate, site, specimen bottle label & requisition.    The planned procedure(s) was again reviewed with the patient. The risks of bleeding, infection, nerve damage and scarring were reviewed. Written authorization was obtained. The patient identity, surgical site, and planned procedure(s) were verified. The provider acted as both surgeon and pathologist.     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  Mohs surgery    Consent obtained: written    Universal Protocol:  Procedure explained and questions answered to patient or proxy's satisfaction: Yes    Test results available and properly labeled: Yes    Pathology report reviewed: Yes    External notes reviewed: Yes    Photo or diagram used for site identification: Yes    Site/side marked: Yes    Slide independently reviewed by Mohs surgeon: Yes    Immediately prior to procedure a time out was called: Yes    Patient identity confirmed: verbally with patient  Preparation: Patient was prepped and draped in usual sterile fashion      Anticoagulation:  Is the patient taking prescription anticoagulant and/or aspirin prescribed/recommended by a physician? No    Was the anticoagulation regimen changed prior to Mohs? No      Anesthesia:  Anesthesia method: local infiltration  Local anesthetic: lidocaine 1% WITH epi    Procedure Details:  Case ID Number: [id number]  Biopsy  accession number: [id number]  Date of biopsy: 3/19/2025  Pre-Op diagnosis: squamous cell carcinoma  Surgical site (from skin exam): Right Buttock  Pre-operative length (cm): 2.2  Pre-operative width (cm): 1.6  Indications for Mohs surgery: tumor size greater than 2 cm    Micrographic Surgery Details:  Post-operative length (cm): 2.5  Post-operative width (cm): 1.8  Number of Mohs stages: 1    Stage 1     Comments: The patient was brought into the operating room and placed in the procedure chair in the appropriate position.  The area positive by previous biopsy was identified and confirmed with the patient. The area of clinically obvious tumor was debulked using a curette and/or scalpel as needed. An incision was made following the Mohs approach through the skin. The specimen was taken to the lab, divided into 2 piece(s) and appropriately chromacoded and processed.       Tumor features identified on Mohs section: no tumor identified    Depth of defect: subcutaneous fat    Patient tolerance of procedure: tolerated well, no immediate complications    Reconstruction:  Was the defect reconstructed? Yes    Was reconstruction performed by the same Mohs surgeon? Yes    Setting of reconstruction: outpatient office  When was reconstruction performed? same day  Type of reconstruction: linear  Linear reconstruction: complex  Subcutaneous Layers (Deep Stitches)   Suture size:  3-0  Suture type:  Vicryl  Stitches:  Buried vertical mattress  Fine/surface layer approximation (top stitches)   Epidermal/Superficial suture size:  5-0  Epidermal/Superficial suture type:  Fast-absorbing gut  Stitches: simple running    Hemostasis achieved with: electrodesiccation  Outcome: patient tolerated procedure well with no complications    Post-procedure details: sterile dressing applied and wound care instructions given    Dressing type: pressure dressing      Staff Communication: Dermatology Local Anesthesia: Site Location: Right Buttock 1 %  Lidocaine / Epinephrine - Amount: 7cc    Complex Linear Repair - Wide Undermining:  Given the location and size of the defect, it was determined that a complex layered linear closure was required to restore normal anatomy and function. The repair was considered complex because extensive undermining was required and performed. The amount of undermining performed was greater than the maximum width of the defect as measured perpendicular to the closure line along at least one entire edge of the defect. Standing cutaneous cones were removed using Burow's triangles. The wound edges were brought into close approximation with buried vertical mattress sutures. The remainder of the wound was then closed with epidermal top sutures.    The final repair measured 4.5 cm              Wound care was discussed, and the patient was given written post-operative wound care instructions.      The patient will follow up with Kaleb Arzola MD as needed for any post operative problems or concerns, and will follow up with their primary dermatologist as scheduled.       IBozena RN  am scribing for, and in the presence of Kaleb Arzola MD    IKaleb MD, personally performed the services described in the documentation as scribed by  Bozena Beckman RN   in my presence, and confirm it is both accurate and complete.

## 2025-05-28 ENCOUNTER — APPOINTMENT (OUTPATIENT)
Dept: RADIOLOGY | Facility: CLINIC | Age: 80
End: 2025-05-28
Payer: MEDICARE

## 2025-05-28 DIAGNOSIS — E61.1 IRON DEFICIENCY: Primary | ICD-10-CM

## 2025-05-28 RX ORDER — FERROUS SULFATE 325(65) MG
325 TABLET ORAL
COMMUNITY
End: 2025-05-28 | Stop reason: SDUPTHER

## 2025-05-29 RX ORDER — FERROUS SULFATE 325(65) MG
325 TABLET ORAL
Qty: 90 TABLET | Refills: 1 | Status: SHIPPED | OUTPATIENT
Start: 2025-05-29

## 2025-06-12 ENCOUNTER — APPOINTMENT (OUTPATIENT)
Dept: PRIMARY CARE | Facility: CLINIC | Age: 80
End: 2025-06-12
Payer: MEDICARE

## 2025-07-04 LAB
25(OH)D3+25(OH)D2 SERPL-MCNC: 30 NG/ML (ref 30–100)
ALBUMIN SERPL-MCNC: 4.3 G/DL (ref 3.6–5.1)
ALP SERPL-CCNC: 53 U/L (ref 37–153)
ALT SERPL-CCNC: 15 U/L (ref 6–29)
ANION GAP SERPL CALCULATED.4IONS-SCNC: 9 MMOL/L (CALC) (ref 7–17)
APPEARANCE UR: CLEAR
AST SERPL-CCNC: 14 U/L (ref 10–35)
BACTERIA #/AREA URNS HPF: ABNORMAL /HPF
BACTERIA UR CULT: ABNORMAL
BACTERIA UR CULT: ABNORMAL
BILIRUB SERPL-MCNC: 0.6 MG/DL (ref 0.2–1.2)
BILIRUB UR QL STRIP: NEGATIVE
BUN SERPL-MCNC: 24 MG/DL (ref 7–25)
CALCIUM SERPL-MCNC: 9.3 MG/DL (ref 8.6–10.4)
CHLORIDE SERPL-SCNC: 101 MMOL/L (ref 98–110)
CHOLEST SERPL-MCNC: 250 MG/DL
CHOLEST/HDLC SERPL: 4.4 (CALC)
CO2 SERPL-SCNC: 28 MMOL/L (ref 20–32)
COLOR UR: YELLOW
CREAT SERPL-MCNC: 0.87 MG/DL (ref 0.6–0.95)
EGFRCR SERPLBLD CKD-EPI 2021: 67 ML/MIN/1.73M2
ERYTHROCYTE [DISTWIDTH] IN BLOOD BY AUTOMATED COUNT: 12.9 % (ref 11–15)
GLUCOSE SERPL-MCNC: 121 MG/DL (ref 65–99)
GLUCOSE UR QL STRIP: NEGATIVE
HCT VFR BLD AUTO: 41.5 % (ref 35–45)
HDLC SERPL-MCNC: 57 MG/DL
HGB BLD-MCNC: 13.6 G/DL (ref 11.7–15.5)
HGB UR QL STRIP: NEGATIVE
HYALINE CASTS #/AREA URNS LPF: ABNORMAL /LPF
KETONES UR QL STRIP: NEGATIVE
LDLC SERPL CALC-MCNC: 158 MG/DL (CALC)
LEUKOCYTE ESTERASE UR QL STRIP: ABNORMAL
MCH RBC QN AUTO: 29.8 PG (ref 27–33)
MCHC RBC AUTO-ENTMCNC: 32.8 G/DL (ref 32–36)
MCV RBC AUTO: 91 FL (ref 80–100)
NITRITE UR QL STRIP: NEGATIVE
NONHDLC SERPL-MCNC: 193 MG/DL (CALC)
PH UR STRIP: 5.5 [PH] (ref 5–8)
PLATELET # BLD AUTO: 228 THOUSAND/UL (ref 140–400)
PMV BLD REES-ECKER: 10.5 FL (ref 7.5–12.5)
POTASSIUM SERPL-SCNC: 4.5 MMOL/L (ref 3.5–5.3)
PROT SERPL-MCNC: 7 G/DL (ref 6.1–8.1)
PROT UR QL STRIP: NEGATIVE
RBC # BLD AUTO: 4.56 MILLION/UL (ref 3.8–5.1)
RBC #/AREA URNS HPF: ABNORMAL /HPF
SERVICE CMNT-IMP: ABNORMAL
SODIUM SERPL-SCNC: 138 MMOL/L (ref 135–146)
SP GR UR STRIP: 1.02 (ref 1–1.03)
SQUAMOUS #/AREA URNS HPF: ABNORMAL /HPF
TRIGL SERPL-MCNC: 192 MG/DL
TSH SERPL-ACNC: 1.79 MIU/L (ref 0.4–4.5)
WBC # BLD AUTO: 6.8 THOUSAND/UL (ref 3.8–10.8)
WBC #/AREA URNS HPF: ABNORMAL /HPF

## 2025-08-15 DIAGNOSIS — E03.9 HYPOTHYROIDISM, UNSPECIFIED TYPE: ICD-10-CM

## 2025-08-15 RX ORDER — LEVOTHYROXINE SODIUM 75 UG/1
TABLET ORAL
Qty: 90 TABLET | Refills: 0 | Status: SHIPPED | OUTPATIENT
Start: 2025-08-15

## 2025-10-28 ENCOUNTER — APPOINTMENT (OUTPATIENT)
Dept: PRIMARY CARE | Facility: CLINIC | Age: 80
End: 2025-10-28
Payer: MEDICARE